# Patient Record
Sex: FEMALE | Race: WHITE | NOT HISPANIC OR LATINO
[De-identification: names, ages, dates, MRNs, and addresses within clinical notes are randomized per-mention and may not be internally consistent; named-entity substitution may affect disease eponyms.]

---

## 2017-03-18 ENCOUNTER — TRANSCRIPTION ENCOUNTER (OUTPATIENT)
Age: 52
End: 2017-03-18

## 2017-12-31 ENCOUNTER — TRANSCRIPTION ENCOUNTER (OUTPATIENT)
Age: 52
End: 2017-12-31

## 2018-03-07 ENCOUNTER — TRANSCRIPTION ENCOUNTER (OUTPATIENT)
Age: 53
End: 2018-03-07

## 2018-08-06 ENCOUNTER — TRANSCRIPTION ENCOUNTER (OUTPATIENT)
Age: 53
End: 2018-08-06

## 2018-12-06 ENCOUNTER — OUTPATIENT (OUTPATIENT)
Dept: OUTPATIENT SERVICES | Facility: HOSPITAL | Age: 53
LOS: 1 days | End: 2018-12-06

## 2018-12-06 PROBLEM — Z00.00 ENCOUNTER FOR PREVENTIVE HEALTH EXAMINATION: Status: ACTIVE | Noted: 2018-12-06

## 2018-12-12 ENCOUNTER — OUTPATIENT (OUTPATIENT)
Dept: OUTPATIENT SERVICES | Facility: HOSPITAL | Age: 53
LOS: 1 days | End: 2018-12-12

## 2019-03-05 ENCOUNTER — TRANSCRIPTION ENCOUNTER (OUTPATIENT)
Age: 54
End: 2019-03-05

## 2019-03-28 ENCOUNTER — OUTPATIENT (OUTPATIENT)
Dept: OUTPATIENT SERVICES | Facility: HOSPITAL | Age: 54
LOS: 1 days | End: 2019-03-28

## 2019-04-08 ENCOUNTER — APPOINTMENT (OUTPATIENT)
Dept: CARDIOLOGY | Facility: CLINIC | Age: 54
End: 2019-04-08
Payer: COMMERCIAL

## 2019-04-08 ENCOUNTER — NON-APPOINTMENT (OUTPATIENT)
Age: 54
End: 2019-04-08

## 2019-04-08 VITALS
OXYGEN SATURATION: 97 % | BODY MASS INDEX: 30.87 KG/M2 | WEIGHT: 199 LBS | SYSTOLIC BLOOD PRESSURE: 126 MMHG | HEART RATE: 75 BPM | HEIGHT: 67.5 IN | DIASTOLIC BLOOD PRESSURE: 78 MMHG

## 2019-04-08 VITALS — DIASTOLIC BLOOD PRESSURE: 82 MMHG | SYSTOLIC BLOOD PRESSURE: 120 MMHG

## 2019-04-08 DIAGNOSIS — Z78.9 OTHER SPECIFIED HEALTH STATUS: ICD-10-CM

## 2019-04-08 DIAGNOSIS — Z86.39 PERSONAL HISTORY OF OTHER ENDOCRINE, NUTRITIONAL AND METABOLIC DISEASE: ICD-10-CM

## 2019-04-08 DIAGNOSIS — I71.2 THORACIC AORTIC ANEURYSM, W/OUT RUPTURE: ICD-10-CM

## 2019-04-08 DIAGNOSIS — Z87.39 PERSONAL HISTORY OF OTHER DISEASES OF THE MUSCULOSKELETAL SYSTEM AND CONNECTIVE TISSUE: ICD-10-CM

## 2019-04-08 DIAGNOSIS — Z87.898 PERSONAL HISTORY OF OTHER SPECIFIED CONDITIONS: ICD-10-CM

## 2019-04-08 DIAGNOSIS — Z82.49 FAMILY HISTORY OF ISCHEMIC HEART DISEASE AND OTHER DISEASES OF THE CIRCULATORY SYSTEM: ICD-10-CM

## 2019-04-08 DIAGNOSIS — J30.2 OTHER SEASONAL ALLERGIC RHINITIS: ICD-10-CM

## 2019-04-08 DIAGNOSIS — G43.909 MIGRAINE, UNSPECIFIED, NOT INTRACTABLE, W/OUT STATUS MIGRAINOSUS: ICD-10-CM

## 2019-04-08 PROCEDURE — 93000 ELECTROCARDIOGRAM COMPLETE: CPT

## 2019-04-08 PROCEDURE — 99204 OFFICE O/P NEW MOD 45 MIN: CPT

## 2019-04-08 PROCEDURE — 99244 OFF/OP CNSLTJ NEW/EST MOD 40: CPT

## 2019-04-08 RX ORDER — ALBUTEROL 90 MCG
90 AEROSOL (GRAM) INHALATION
Refills: 0 | Status: ACTIVE | COMMUNITY

## 2019-04-09 ENCOUNTER — OUTPATIENT (OUTPATIENT)
Dept: OUTPATIENT SERVICES | Facility: HOSPITAL | Age: 54
LOS: 1 days | End: 2019-04-09

## 2019-04-23 ENCOUNTER — APPOINTMENT (OUTPATIENT)
Dept: CARDIOLOGY | Facility: CLINIC | Age: 54
End: 2019-04-23
Payer: COMMERCIAL

## 2019-04-23 PROCEDURE — 93306 TTE W/DOPPLER COMPLETE: CPT

## 2019-04-24 ENCOUNTER — OUTPATIENT (OUTPATIENT)
Dept: OUTPATIENT SERVICES | Facility: HOSPITAL | Age: 54
LOS: 1 days | End: 2019-04-24

## 2019-04-25 LAB — INR PPP: 2.9

## 2019-05-03 ENCOUNTER — OUTPATIENT (OUTPATIENT)
Dept: OUTPATIENT SERVICES | Facility: HOSPITAL | Age: 54
LOS: 1 days | End: 2019-05-03

## 2019-05-08 ENCOUNTER — RESULT REVIEW (OUTPATIENT)
Age: 54
End: 2019-05-08

## 2019-05-08 LAB — INR PPP: 1.6

## 2019-05-09 ENCOUNTER — RESULT REVIEW (OUTPATIENT)
Age: 54
End: 2019-05-09

## 2019-05-16 ENCOUNTER — OUTPATIENT (OUTPATIENT)
Dept: OUTPATIENT SERVICES | Facility: HOSPITAL | Age: 54
LOS: 1 days | End: 2019-05-16

## 2019-05-17 LAB — INR PPP: 1.8

## 2019-05-23 ENCOUNTER — OUTPATIENT (OUTPATIENT)
Dept: OUTPATIENT SERVICES | Facility: HOSPITAL | Age: 54
LOS: 1 days | End: 2019-05-23

## 2019-05-24 LAB — INR PPP: 2

## 2019-06-03 ENCOUNTER — OUTPATIENT (OUTPATIENT)
Dept: OUTPATIENT SERVICES | Facility: HOSPITAL | Age: 54
LOS: 1 days | End: 2019-06-03

## 2019-06-05 ENCOUNTER — RESULT REVIEW (OUTPATIENT)
Age: 54
End: 2019-06-05

## 2019-06-05 LAB — INR PPP: 3.3

## 2019-06-12 ENCOUNTER — OUTPATIENT (OUTPATIENT)
Dept: OUTPATIENT SERVICES | Facility: HOSPITAL | Age: 54
LOS: 1 days | End: 2019-06-12

## 2019-06-14 LAB — INR PPP: 2.5

## 2019-06-24 ENCOUNTER — OUTPATIENT (OUTPATIENT)
Dept: OUTPATIENT SERVICES | Facility: HOSPITAL | Age: 54
LOS: 1 days | End: 2019-06-24

## 2019-06-27 LAB — INR PPP: 2.1

## 2019-07-12 ENCOUNTER — OTHER (OUTPATIENT)
Age: 54
End: 2019-07-12

## 2019-07-14 ENCOUNTER — TRANSCRIPTION ENCOUNTER (OUTPATIENT)
Age: 54
End: 2019-07-14

## 2019-07-18 ENCOUNTER — TRANSCRIPTION ENCOUNTER (OUTPATIENT)
Age: 54
End: 2019-07-18

## 2019-07-24 ENCOUNTER — LABORATORY RESULT (OUTPATIENT)
Age: 54
End: 2019-07-24

## 2019-07-24 ENCOUNTER — TRANSCRIPTION ENCOUNTER (OUTPATIENT)
Age: 54
End: 2019-07-24

## 2019-07-25 ENCOUNTER — MEDICATION RENEWAL (OUTPATIENT)
Age: 54
End: 2019-07-25

## 2019-08-04 ENCOUNTER — TRANSCRIPTION ENCOUNTER (OUTPATIENT)
Age: 54
End: 2019-08-04

## 2019-08-24 ENCOUNTER — TRANSCRIPTION ENCOUNTER (OUTPATIENT)
Age: 54
End: 2019-08-24

## 2019-09-27 ENCOUNTER — LABORATORY RESULT (OUTPATIENT)
Age: 54
End: 2019-09-27

## 2019-09-27 ENCOUNTER — TRANSCRIPTION ENCOUNTER (OUTPATIENT)
Age: 54
End: 2019-09-27

## 2019-10-07 ENCOUNTER — OUTPATIENT (OUTPATIENT)
Dept: OUTPATIENT SERVICES | Facility: HOSPITAL | Age: 54
LOS: 1 days | End: 2019-10-07

## 2019-10-07 ENCOUNTER — APPOINTMENT (OUTPATIENT)
Dept: CT IMAGING | Facility: CLINIC | Age: 54
End: 2019-10-07
Payer: COMMERCIAL

## 2019-10-07 ENCOUNTER — APPOINTMENT (OUTPATIENT)
Dept: CARDIOLOGY | Facility: CLINIC | Age: 54
End: 2019-10-07
Payer: COMMERCIAL

## 2019-10-07 ENCOUNTER — NON-APPOINTMENT (OUTPATIENT)
Age: 54
End: 2019-10-07

## 2019-10-07 VITALS
BODY MASS INDEX: 28.7 KG/M2 | WEIGHT: 185 LBS | SYSTOLIC BLOOD PRESSURE: 110 MMHG | DIASTOLIC BLOOD PRESSURE: 62 MMHG | OXYGEN SATURATION: 97 % | HEART RATE: 66 BPM | HEIGHT: 67.5 IN

## 2019-10-07 PROCEDURE — 93000 ELECTROCARDIOGRAM COMPLETE: CPT

## 2019-10-07 PROCEDURE — 99214 OFFICE O/P EST MOD 30 MIN: CPT

## 2019-10-07 PROCEDURE — 70490 CT SOFT TISSUE NECK W/O DYE: CPT

## 2019-10-07 RX ORDER — WARFARIN SODIUM 5 MG/1
5 TABLET ORAL
Refills: 0 | Status: DISCONTINUED | COMMUNITY
End: 2019-10-07

## 2019-10-07 RX ORDER — UBIDECARENONE 50 MG
CAPSULE ORAL
Refills: 0 | Status: DISCONTINUED | COMMUNITY
End: 2019-10-07

## 2019-10-07 RX ORDER — BILBERRY
10 CAPSULE ORAL
Refills: 0 | Status: DISCONTINUED | COMMUNITY
End: 2019-10-07

## 2019-10-08 ENCOUNTER — TRANSCRIPTION ENCOUNTER (OUTPATIENT)
Age: 54
End: 2019-10-08

## 2019-11-08 ENCOUNTER — LABORATORY RESULT (OUTPATIENT)
Age: 54
End: 2019-11-08

## 2019-11-11 ENCOUNTER — TRANSCRIPTION ENCOUNTER (OUTPATIENT)
Age: 54
End: 2019-11-11

## 2019-11-12 ENCOUNTER — APPOINTMENT (OUTPATIENT)
Dept: RADIOLOGY | Facility: CLINIC | Age: 54
End: 2019-11-12
Payer: COMMERCIAL

## 2019-11-12 PROCEDURE — 77080 DXA BONE DENSITY AXIAL: CPT

## 2019-11-21 ENCOUNTER — APPOINTMENT (OUTPATIENT)
Dept: MAMMOGRAPHY | Facility: CLINIC | Age: 54
End: 2019-11-21
Payer: COMMERCIAL

## 2019-11-21 PROCEDURE — 77067 SCR MAMMO BI INCL CAD: CPT

## 2019-11-21 PROCEDURE — 77063 BREAST TOMOSYNTHESIS BI: CPT

## 2019-12-04 ENCOUNTER — TRANSCRIPTION ENCOUNTER (OUTPATIENT)
Age: 54
End: 2019-12-04

## 2019-12-04 ENCOUNTER — LABORATORY RESULT (OUTPATIENT)
Age: 54
End: 2019-12-04

## 2019-12-24 ENCOUNTER — TRANSCRIPTION ENCOUNTER (OUTPATIENT)
Age: 54
End: 2019-12-24

## 2019-12-24 ENCOUNTER — LABORATORY RESULT (OUTPATIENT)
Age: 54
End: 2019-12-24

## 2020-01-04 ENCOUNTER — TRANSCRIPTION ENCOUNTER (OUTPATIENT)
Age: 55
End: 2020-01-04

## 2020-01-09 ENCOUNTER — TRANSCRIPTION ENCOUNTER (OUTPATIENT)
Age: 55
End: 2020-01-09

## 2020-01-10 LAB — INR PPP: 1.88

## 2020-01-23 ENCOUNTER — OUTPATIENT (OUTPATIENT)
Dept: OUTPATIENT SERVICES | Facility: HOSPITAL | Age: 55
LOS: 1 days | End: 2020-01-23

## 2020-01-31 ENCOUNTER — APPOINTMENT (OUTPATIENT)
Dept: CT IMAGING | Facility: CLINIC | Age: 55
End: 2020-01-31
Payer: COMMERCIAL

## 2020-01-31 ENCOUNTER — OUTPATIENT (OUTPATIENT)
Dept: OUTPATIENT SERVICES | Facility: HOSPITAL | Age: 55
LOS: 1 days | End: 2020-01-31

## 2020-01-31 PROCEDURE — 74176 CT ABD & PELVIS W/O CONTRAST: CPT

## 2020-02-17 LAB — INR PPP: 3.02

## 2020-02-21 ENCOUNTER — TRANSCRIPTION ENCOUNTER (OUTPATIENT)
Age: 55
End: 2020-02-21

## 2020-03-10 ENCOUNTER — TRANSCRIPTION ENCOUNTER (OUTPATIENT)
Age: 55
End: 2020-03-10

## 2020-03-13 ENCOUNTER — TRANSCRIPTION ENCOUNTER (OUTPATIENT)
Age: 55
End: 2020-03-13

## 2020-03-13 LAB — INR PPP: 2.27

## 2020-04-13 ENCOUNTER — APPOINTMENT (OUTPATIENT)
Dept: CARDIOLOGY | Facility: CLINIC | Age: 55
End: 2020-04-13
Payer: COMMERCIAL

## 2020-04-13 PROCEDURE — 99442: CPT

## 2020-04-25 ENCOUNTER — MESSAGE (OUTPATIENT)
Age: 55
End: 2020-04-25

## 2020-04-30 ENCOUNTER — TRANSCRIPTION ENCOUNTER (OUTPATIENT)
Age: 55
End: 2020-04-30

## 2020-05-08 LAB — INR PPP: 1.61

## 2020-05-18 ENCOUNTER — OUTPATIENT (OUTPATIENT)
Dept: OUTPATIENT SERVICES | Facility: HOSPITAL | Age: 55
LOS: 1 days | End: 2020-05-18

## 2020-05-18 LAB — INR PPP: 2

## 2020-05-28 ENCOUNTER — OUTPATIENT (OUTPATIENT)
Dept: OUTPATIENT SERVICES | Facility: HOSPITAL | Age: 55
LOS: 1 days | End: 2020-05-28

## 2020-05-28 LAB — INR PPP: 1.8

## 2020-06-05 ENCOUNTER — OUTPATIENT (OUTPATIENT)
Dept: OUTPATIENT SERVICES | Facility: HOSPITAL | Age: 55
LOS: 1 days | End: 2020-06-05

## 2020-06-10 LAB — INR PPP: NORMAL

## 2020-06-13 ENCOUNTER — TRANSCRIPTION ENCOUNTER (OUTPATIENT)
Age: 55
End: 2020-06-13

## 2020-06-18 ENCOUNTER — APPOINTMENT (OUTPATIENT)
Dept: CARDIOLOGY | Facility: CLINIC | Age: 55
End: 2020-06-18

## 2020-06-24 LAB — INR PPP: 2.36

## 2020-07-14 ENCOUNTER — OUTPATIENT (OUTPATIENT)
Dept: OUTPATIENT SERVICES | Facility: HOSPITAL | Age: 55
LOS: 1 days | End: 2020-07-14

## 2020-07-15 LAB — INR PPP: NORMAL

## 2020-08-02 ENCOUNTER — TRANSCRIPTION ENCOUNTER (OUTPATIENT)
Age: 55
End: 2020-08-02

## 2020-08-07 LAB — INR PPP: 2.48

## 2020-09-02 ENCOUNTER — TRANSCRIPTION ENCOUNTER (OUTPATIENT)
Age: 55
End: 2020-09-02

## 2020-09-04 LAB — INR PPP: 2.3

## 2020-09-09 ENCOUNTER — APPOINTMENT (OUTPATIENT)
Dept: ULTRASOUND IMAGING | Facility: CLINIC | Age: 55
End: 2020-09-09
Payer: COMMERCIAL

## 2020-09-09 PROCEDURE — 76536 US EXAM OF HEAD AND NECK: CPT

## 2020-09-30 ENCOUNTER — TRANSCRIPTION ENCOUNTER (OUTPATIENT)
Age: 55
End: 2020-09-30

## 2020-10-01 LAB — INR PPP: 1.76

## 2020-10-05 ENCOUNTER — RX RENEWAL (OUTPATIENT)
Age: 55
End: 2020-10-05

## 2020-11-30 ENCOUNTER — TRANSCRIPTION ENCOUNTER (OUTPATIENT)
Age: 55
End: 2020-11-30

## 2020-12-07 ENCOUNTER — OUTPATIENT (OUTPATIENT)
Dept: OUTPATIENT SERVICES | Facility: HOSPITAL | Age: 55
LOS: 1 days | End: 2020-12-07

## 2020-12-07 LAB — INR PPP: 1.9

## 2020-12-10 LAB — INR PPP: 2.7

## 2020-12-17 ENCOUNTER — APPOINTMENT (OUTPATIENT)
Dept: MAMMOGRAPHY | Facility: CLINIC | Age: 55
End: 2020-12-17
Payer: COMMERCIAL

## 2020-12-17 PROCEDURE — 77067 SCR MAMMO BI INCL CAD: CPT

## 2020-12-17 PROCEDURE — 77063 BREAST TOMOSYNTHESIS BI: CPT

## 2020-12-20 ENCOUNTER — TRANSCRIPTION ENCOUNTER (OUTPATIENT)
Age: 55
End: 2020-12-20

## 2020-12-20 ENCOUNTER — LABORATORY RESULT (OUTPATIENT)
Age: 55
End: 2020-12-20

## 2021-01-06 ENCOUNTER — APPOINTMENT (OUTPATIENT)
Dept: ULTRASOUND IMAGING | Facility: CLINIC | Age: 56
End: 2021-01-06
Payer: COMMERCIAL

## 2021-01-06 PROCEDURE — 76642 ULTRASOUND BREAST LIMITED: CPT | Mod: LT

## 2021-01-13 ENCOUNTER — TRANSCRIPTION ENCOUNTER (OUTPATIENT)
Age: 56
End: 2021-01-13

## 2021-01-18 LAB — INR PPP: 2.42

## 2021-01-30 ENCOUNTER — TRANSCRIPTION ENCOUNTER (OUTPATIENT)
Age: 56
End: 2021-01-30

## 2021-02-11 LAB — INR PPP: 2.19

## 2021-02-12 ENCOUNTER — APPOINTMENT (OUTPATIENT)
Dept: CARDIOLOGY | Facility: CLINIC | Age: 56
End: 2021-02-12
Payer: COMMERCIAL

## 2021-02-12 PROCEDURE — 93306 TTE W/DOPPLER COMPLETE: CPT

## 2021-02-12 PROCEDURE — 99072 ADDL SUPL MATRL&STAF TM PHE: CPT

## 2021-02-26 ENCOUNTER — APPOINTMENT (OUTPATIENT)
Dept: CARDIOLOGY | Facility: CLINIC | Age: 56
End: 2021-02-26
Payer: COMMERCIAL

## 2021-02-26 ENCOUNTER — NON-APPOINTMENT (OUTPATIENT)
Age: 56
End: 2021-02-26

## 2021-02-26 VITALS
HEIGHT: 67.5 IN | WEIGHT: 189 LBS | DIASTOLIC BLOOD PRESSURE: 80 MMHG | SYSTOLIC BLOOD PRESSURE: 124 MMHG | OXYGEN SATURATION: 99 % | TEMPERATURE: 97.5 F | HEART RATE: 84 BPM | BODY MASS INDEX: 29.32 KG/M2

## 2021-02-26 DIAGNOSIS — G45.9 TRANSIENT CEREBRAL ISCHEMIC ATTACK, UNSPECIFIED: ICD-10-CM

## 2021-02-26 PROCEDURE — 93000 ELECTROCARDIOGRAM COMPLETE: CPT

## 2021-02-26 PROCEDURE — 99214 OFFICE O/P EST MOD 30 MIN: CPT

## 2021-02-26 PROCEDURE — 99072 ADDL SUPL MATRL&STAF TM PHE: CPT

## 2021-02-26 RX ORDER — GABAPENTIN 300 MG
300 TABLET ORAL 3 TIMES DAILY
Refills: 0 | Status: DISCONTINUED | COMMUNITY
End: 2021-02-26

## 2021-02-27 ENCOUNTER — TRANSCRIPTION ENCOUNTER (OUTPATIENT)
Age: 56
End: 2021-02-27

## 2021-03-01 LAB — INR PPP: 2.2

## 2021-03-10 ENCOUNTER — TRANSCRIPTION ENCOUNTER (OUTPATIENT)
Age: 56
End: 2021-03-10

## 2021-03-15 LAB — INR PPP: 2.52

## 2021-03-17 ENCOUNTER — RX RENEWAL (OUTPATIENT)
Age: 56
End: 2021-03-17

## 2021-03-17 ENCOUNTER — NON-APPOINTMENT (OUTPATIENT)
Age: 56
End: 2021-03-17

## 2021-04-19 ENCOUNTER — APPOINTMENT (OUTPATIENT)
Dept: FAMILY MEDICINE | Facility: CLINIC | Age: 56
End: 2021-04-19

## 2021-04-27 ENCOUNTER — TRANSCRIPTION ENCOUNTER (OUTPATIENT)
Age: 56
End: 2021-04-27

## 2021-04-29 LAB — INR PPP: 2

## 2021-06-03 ENCOUNTER — OUTPATIENT (OUTPATIENT)
Dept: OUTPATIENT SERVICES | Facility: HOSPITAL | Age: 56
LOS: 1 days | End: 2021-06-03

## 2021-06-15 LAB — INR PPP: 1.4

## 2021-06-28 ENCOUNTER — APPOINTMENT (OUTPATIENT)
Dept: ULTRASOUND IMAGING | Facility: CLINIC | Age: 56
End: 2021-06-28
Payer: COMMERCIAL

## 2021-06-28 ENCOUNTER — APPOINTMENT (OUTPATIENT)
Dept: MAMMOGRAPHY | Facility: CLINIC | Age: 56
End: 2021-06-28
Payer: COMMERCIAL

## 2021-06-28 PROCEDURE — G0279: CPT | Mod: LT

## 2021-06-28 PROCEDURE — 77065 DX MAMMO INCL CAD UNI: CPT | Mod: LT

## 2021-06-28 PROCEDURE — 76642 ULTRASOUND BREAST LIMITED: CPT | Mod: LT

## 2021-07-14 ENCOUNTER — APPOINTMENT (OUTPATIENT)
Dept: BREAST CENTER | Facility: CLINIC | Age: 56
End: 2021-07-14
Payer: COMMERCIAL

## 2021-07-14 VITALS
HEIGHT: 67.5 IN | BODY MASS INDEX: 29.94 KG/M2 | SYSTOLIC BLOOD PRESSURE: 121 MMHG | DIASTOLIC BLOOD PRESSURE: 76 MMHG | WEIGHT: 193 LBS | HEART RATE: 67 BPM | TEMPERATURE: 97.7 F | OXYGEN SATURATION: 99 %

## 2021-07-14 DIAGNOSIS — Z80.0 FAMILY HISTORY OF MALIGNANT NEOPLASM OF DIGESTIVE ORGANS: ICD-10-CM

## 2021-07-14 DIAGNOSIS — Z80.49 FAMILY HISTORY OF MALIGNANT NEOPLASM OF OTHER GENITAL ORGANS: ICD-10-CM

## 2021-07-14 DIAGNOSIS — R92.8 OTHER ABNORMAL AND INCONCLUSIVE FINDINGS ON DIAGNOSTIC IMAGING OF BREAST: ICD-10-CM

## 2021-07-14 DIAGNOSIS — Z80.41 FAMILY HISTORY OF MALIGNANT NEOPLASM OF OVARY: ICD-10-CM

## 2021-07-14 DIAGNOSIS — Z80.3 FAMILY HISTORY OF MALIGNANT NEOPLASM OF BREAST: ICD-10-CM

## 2021-07-14 PROCEDURE — 99072 ADDL SUPL MATRL&STAF TM PHE: CPT

## 2021-07-14 PROCEDURE — 99203 OFFICE O/P NEW LOW 30 MIN: CPT

## 2021-07-14 NOTE — HISTORY OF PRESENT ILLNESS
[FreeTextEntry1] : Pt is a 56 year old white female referred for consultation by Rachel Coffey MD for abnormal mmg. \par Pt was a former pt of mine 11 years ago at [facility]. \par She recently moved back from [place] and works at [employer]. \par \par 12/17/20, NFR, Bilat sMMG: fatty, no susp findings, L 3x2mm slightly lobulated nodule in UO aspect stable comp to 2019, U/S can be obtained for further eval, bilat vascular calcs appear benign, rec add imaging, BR0 ADDENDUM: pt will return for L breast U/S\par 1/6/21, Fior, L U/S: no susp findings, nodule seen on mmg is not visualized on U/S, f/u mmg on L breast rec in 6 mos, BR3\par 6/28/21, NFR, L dMMG: fatty, no susp findings, small focal asymm lateral breast no sig interval change, rec 6 mos.f/u, BR3\par 6/28/21, NFR, L U/S: no susp findings, mmg asymm not identified on U/S, rec mmg 6 mos, BR3\par Pt denies any breast lesions, discharge or masses.\par \par Fhx: mGreat Aunt w/ Breast at 70, mGreat Aunt w/ Ovarian at ? age. Pt's MGM had 3 sisters.

## 2021-07-14 NOTE — PAST MEDICAL HISTORY
[Perimenopausal] : The patient is perimenopausal [Menarche Age ____] : age at menarche was [unfilled] [Menopause Age____] : age at menopause was [unfilled] [Total Preg ___] : G[unfilled] [Live Births ___] : P[unfilled]  [Full Term ___] : Full Term: [unfilled] [Living ___] : Living: [unfilled] [Age At Live Birth ___] : Age at live birth: [unfilled] [History of Hormone Replacement Treatment] : has no history of hormone replacement treatment [FreeTextEntry2] : 2 boys [FreeTextEntry7] : 8 years [FreeTextEntry8] : 8 months and 3 months

## 2021-07-14 NOTE — DATA REVIEWED
[FreeTextEntry1] : 12/17/20, NFR, Bilat sMMG: fatty, no susp findings, L 3x2mm slightly lobulated nodule in UO aspect stable comp to 2019, U/S can be obtained for further eval, bilat vascular calcs appear benign, rec add imaging, BR0 ADDENDUM: pt will return for L breast U/S\par 1/6/21, NAVJOT Childress U/S: no susp findings, nodule seen on mmg is not visualized on U/S, f/u mmg on L breast rec in 6 mos, BR3\par 6/28/21, NFR, L dMMG: fatty, no susp findings, small focal asymm lateral breast no sig interval change, rec 6 mos.f/u, BR3\par 6/28/21, NFR, L U/S: no susp findings, mmg asymm not identified on U/S, rec mmg 6 mos, BR3\par Pt denies any breast lesions, discharge or masses.

## 2021-07-14 NOTE — ASSESSMENT
[FreeTextEntry1] : Pt is a 56 year old white female referred for consultation by Rachel Coffey MD for abnormal mmg. \par Pt was a former pt of mine 11 years ago at [facility]. \par She recently moved back from [place] and works at [employer]. \par \par 12/17/20, NFR, Bilat sMMG: fatty, no susp findings, L 3x2mm slightly lobulated nodule in UO aspect stable comp to 2019, U/S can be obtained for further eval, bilat vascular calcs appear benign, rec add imaging, BR0 ADDENDUM: pt will return for L breast U/S\par 1/6/21, Fior, L U/S: no susp findings, nodule seen on mmg is not visualized on U/S, f/u mmg on L breast rec in 6 mos, BR3\par 6/28/21, NFR, L dMMG: fatty, no susp findings, small focal asymm lateral breast no sig interval change, rec 6 mos.f/u, BR3\par 6/28/21, NFR, L U/S: no susp findings, mmg asymm not identified on U/S, rec mmg 6 mos, BR3\par Pt denies any breast lesions, discharge or masses.\par \par Fhx: mGreat Aunt w/ Breast at 70, mGreat Aunt w/ Ovarian at ? age. Pt's MGM had 3 sisters.\par CBE: ELLIOT, well healed scar on right UIQ. NO lymphadenopathy. \par Reviewed studies with pt, stable asymmetry on Left, rec is for 6 mos f.u at same time her annual mmg and u/s will be due. Also discussed genetic testing, pt is eligible given fhx of ovarian cancer. Pt accepts.

## 2021-07-14 NOTE — PHYSICAL EXAM
[Normocephalic] : normocephalic [Atraumatic] : atraumatic [Supple] : supple [No Supraclavicular Adenopathy] : no supraclavicular adenopathy [No Thyromegaly] : no thyromegaly [Examined in the supine and seated position] : examined in the supine and seated position [No dominant masses] : no dominant masses in right breast  [No dominant masses] : no dominant masses left breast [No Nipple Retraction] : no left nipple retraction [No Nipple Discharge] : no left nipple discharge [No Axillary Lymphadenopathy] : no left axillary lymphadenopathy [No Edema] : no edema [No Swelling] : no swelling [Full ROM] : full range of motion [No Rashes] : no rashes [No Ulceration] : no ulceration [Bra Size: ___] : Bra Size: [unfilled]

## 2021-07-21 ENCOUNTER — NON-APPOINTMENT (OUTPATIENT)
Age: 56
End: 2021-07-21

## 2021-08-27 ENCOUNTER — APPOINTMENT (OUTPATIENT)
Dept: CARDIOLOGY | Facility: CLINIC | Age: 56
End: 2021-08-27

## 2021-08-30 ENCOUNTER — OUTPATIENT (OUTPATIENT)
Dept: OUTPATIENT SERVICES | Facility: HOSPITAL | Age: 56
LOS: 1 days | End: 2021-08-30

## 2021-09-02 LAB — INR PPP: 2

## 2021-09-03 ENCOUNTER — APPOINTMENT (OUTPATIENT)
Dept: CARDIOLOGY | Facility: CLINIC | Age: 56
End: 2021-09-03
Payer: COMMERCIAL

## 2021-09-03 ENCOUNTER — NON-APPOINTMENT (OUTPATIENT)
Age: 56
End: 2021-09-03

## 2021-09-03 VITALS
TEMPERATURE: 97.3 F | BODY MASS INDEX: 30.56 KG/M2 | SYSTOLIC BLOOD PRESSURE: 124 MMHG | WEIGHT: 197 LBS | HEART RATE: 94 BPM | HEIGHT: 67.5 IN | DIASTOLIC BLOOD PRESSURE: 86 MMHG | OXYGEN SATURATION: 97 %

## 2021-09-03 PROCEDURE — 99214 OFFICE O/P EST MOD 30 MIN: CPT

## 2021-09-03 PROCEDURE — 93000 ELECTROCARDIOGRAM COMPLETE: CPT

## 2021-09-04 ENCOUNTER — NON-APPOINTMENT (OUTPATIENT)
Age: 56
End: 2021-09-04

## 2021-09-05 PROCEDURE — 71101 X-RAY EXAM UNILAT RIBS/CHEST: CPT | Mod: RT

## 2021-09-07 ENCOUNTER — APPOINTMENT (OUTPATIENT)
Dept: CT IMAGING | Facility: CLINIC | Age: 56
End: 2021-09-07
Payer: COMMERCIAL

## 2021-09-07 PROCEDURE — 71250 CT THORAX DX C-: CPT

## 2021-09-07 PROCEDURE — 74176 CT ABD & PELVIS W/O CONTRAST: CPT

## 2021-09-20 ENCOUNTER — OUTPATIENT (OUTPATIENT)
Dept: OUTPATIENT SERVICES | Facility: HOSPITAL | Age: 56
LOS: 1 days | End: 2021-09-20

## 2021-09-20 LAB — INR PPP: 2.2

## 2021-09-22 ENCOUNTER — APPOINTMENT (OUTPATIENT)
Dept: PEDIATRIC ALLERGY IMMUNOLOGY | Facility: CLINIC | Age: 56
End: 2021-09-22
Payer: COMMERCIAL

## 2021-09-22 DIAGNOSIS — J45.20 MILD INTERMITTENT ASTHMA, UNCOMPLICATED: ICD-10-CM

## 2021-09-22 DIAGNOSIS — T50.Z95A ADVERSE EFFECT OF OTHER VACCINES AND BIOLOGICAL SUBSTANCES, INITIAL ENCOUNTER: ICD-10-CM

## 2021-09-22 DIAGNOSIS — T50.905A ADVERSE EFFECT OF UNSPECIFIED DRUGS, MEDICAMENTS AND BIOLOGICAL SUBSTANCES, INITIAL ENCOUNTER: ICD-10-CM

## 2021-09-22 DIAGNOSIS — J30.9 ALLERGIC RHINITIS, UNSPECIFIED: ICD-10-CM

## 2021-09-22 DIAGNOSIS — Z91.018 ALLERGY TO OTHER FOODS: ICD-10-CM

## 2021-09-22 DIAGNOSIS — J45.909 UNSPECIFIED ASTHMA, UNCOMPLICATED: ICD-10-CM

## 2021-09-22 DIAGNOSIS — Z87.2 PERSONAL HISTORY OF DISEASES OF THE SKIN AND SUBCUTANEOUS TISSUE: ICD-10-CM

## 2021-09-22 PROCEDURE — 99205 OFFICE O/P NEW HI 60 MIN: CPT | Mod: 95

## 2021-09-28 ENCOUNTER — NON-APPOINTMENT (OUTPATIENT)
Age: 56
End: 2021-09-28

## 2021-09-29 ENCOUNTER — NON-APPOINTMENT (OUTPATIENT)
Age: 56
End: 2021-09-29

## 2021-10-01 ENCOUNTER — TRANSCRIPTION ENCOUNTER (OUTPATIENT)
Age: 56
End: 2021-10-01

## 2021-10-02 PROBLEM — Z87.2 HISTORY OF CHRONIC URTICARIA: Status: RESOLVED | Noted: 2021-10-02 | Resolved: 2021-10-02

## 2021-10-02 PROBLEM — T50.905A ADVERSE REACTION TO DRUG, INITIAL ENCOUNTER: Status: ACTIVE | Noted: 2021-10-02

## 2021-10-02 PROBLEM — J30.9 ALLERGIC RHINITIS: Status: ACTIVE | Noted: 2021-10-02

## 2021-10-02 PROBLEM — Z91.018 TREE NUT ALLERGY: Status: ACTIVE | Noted: 2021-10-02

## 2021-10-02 PROBLEM — T50.Z95A ADVERSE REACTION TO VACCINE, INITIAL ENCOUNTER: Status: ACTIVE | Noted: 2021-10-02

## 2021-10-02 PROBLEM — J45.20 INTERMITTENT ASTHMA WITHOUT COMPLICATION: Status: ACTIVE | Noted: 2021-10-02

## 2021-10-02 PROBLEM — J45.909 ASTHMA: Noted: 2019-04-08

## 2021-10-02 NOTE — PHYSICAL EXAM
[Alert] : alert [Healthy Appearance] : healthy appearance [No Acute Distress] : no acute distress [Normal Lips/Tongue] : the lips and tongue were normal [Normal Outer Ear/Nose] : the ears and nose were normal in appearance [No Nasal Discharge] : no nasal discharge [Normal Rate and Effort] : normal respiratory rhythm and effort [Conjunctival Erythema] : no conjunctival erythema

## 2021-10-02 NOTE — CONSULT LETTER
[Dear  ___] : Dear  [unfilled], [Courtesy Letter:] : I had the pleasure of seeing your patient, [unfilled], in my office today. [Please see my note below.] : Please see my note below. [Sincerely,] : Sincerely, [Consult Closing:] : Thank you very much for allowing me to participate in the care of this patient.  If you have any questions, please do not hesitate to contact me. [FreeTextEntry3] : Billie Ybarra MD\par Attending, Division of Allergy and Immunology\par [facility]

## 2021-10-02 NOTE — REVIEW OF SYSTEMS
[Rhinorrhea] : rhinorrhea [Nasal Congestion] : nasal congestion [Urticaria] : urticaria [Pruritus] : pruritus [Nl] : Genitourinary

## 2021-10-02 NOTE — HISTORY OF PRESENT ILLNESS
[de-identified] : This visit was was provided via telehealth using real-time 2-way audio visual technology. The patient, was located at home, [place], [place], at the time of the visit.\par The provider, PRESLEY REYES MD, was located at the medical office located in [address] [place] [address] [address] [place] [address] at the time of the visit. The patient and provider participated in the telehealth encounter. \par Verbal consent for telehealth services was given on 9/22/21 by the patient. \par  \par 56-year-old female with systemic lupus erythematosus (SLE), Hashimoto disease, antiphospholipid syndrome (APLS), ascending aortic aneurysm  presenting for evaluation regarding concerns about COVID19 vaccination due to history of asthma, chronic urticaria, tree nut allergy and adverse reaction to drugs and influenza vaccine.\par \par Chronic urticaria: Years ago had recurrent hives. Evaluated by an allergist and diagnosed with chronic urticaria and angioedema. Treated with multiple antihistamines per day. Resolved. \par \par Food allergy: In 2019 while eating at [employer] developed sudden "neck swelling". Self administered EpiPen and taken to ED. Treated with antihistamines and systemic steroids. Evaluated for food allergy afterwards. Tested positive to tree nuts. \par \par Adverse reaction to drugs and vaccine:\par Penicillin - given for dental procedure. Developed facial swelling and rash. Can't recall timing. In 20s.\par Levofloxacin, moxifloxin - throat closing.\par Lovenox: Itching and asthma exacerbation.\par Influenza vaccine - when she received influenza vaccines made in chicken embryos, had severe fever, fatigue and was sick for over a week. When gets Flublok she doesn't have any side effects.\par \par Latex allergy: Hives after exposure.\par \par Adhesives: Ulcer at site of adhesive and asthma exacerbation after adhesive application for EKG.\par \par IV contrast: Hives within minutes.\par \par Asthma: Not on maintenance medication. Uses albuterol as needed. Under good control. No active symptoms. \par \par Allergic rhinitis: Runny nose, nasal congestion. Previously skin tested positive to dust mites, pollen and feathers. \par \par Has never taken Miralax. Took Colace without adverse effects. No colonoscopy done before since she couldn't get clearance for the procedure due to her aortic aneurysm. and other comorbidities. \par \par Of note, has history of multiple bouts of pericarditis due to SLE. Has had TIAs due APLS. Taking amitriptyline for migraine headaches and reports not being able to come off of it due to severe headaches when off. \par \par Working as a radiology technician.\par

## 2021-10-02 NOTE — REASON FOR VISIT
[Initial Consultation] : an initial consultation for [Hay Fever] : hay fever [FreeTextEntry2] : concern regarding COVID19 vaccination

## 2021-10-21 ENCOUNTER — APPOINTMENT (OUTPATIENT)
Dept: PEDIATRIC ALLERGY IMMUNOLOGY | Facility: CLINIC | Age: 56
End: 2021-10-21
Payer: COMMERCIAL

## 2021-10-21 ENCOUNTER — MED ADMIN CHARGE (OUTPATIENT)
Age: 56
End: 2021-10-21

## 2021-10-21 VITALS
TEMPERATURE: 96.2 F | SYSTOLIC BLOOD PRESSURE: 138 MMHG | DIASTOLIC BLOOD PRESSURE: 92 MMHG | HEART RATE: 96 BPM | HEIGHT: 67.5 IN | OXYGEN SATURATION: 97 % | WEIGHT: 190 LBS | BODY MASS INDEX: 29.47 KG/M2

## 2021-10-21 DIAGNOSIS — Z23 ENCOUNTER FOR IMMUNIZATION: ICD-10-CM

## 2021-10-21 PROCEDURE — 99212 OFFICE O/P EST SF 10 MIN: CPT | Mod: 25

## 2021-10-21 PROCEDURE — 0031A: CPT

## 2021-10-21 RX ORDER — RANITIDINE HYDROCHLORIDE 150 MG/1
150 TABLET, FILM COATED ORAL
Refills: 0 | Status: COMPLETED | COMMUNITY
End: 2021-10-21

## 2021-10-25 PROBLEM — Z23 NEED FOR COVID-19 VACCINE: Status: RESOLVED | Noted: 2021-10-02 | Resolved: 2021-10-25

## 2021-10-25 PROBLEM — Z23 NEED FOR COVID-19 VACCINE: Status: ACTIVE | Noted: 2021-10-25

## 2021-11-02 ENCOUNTER — OUTPATIENT (OUTPATIENT)
Dept: OUTPATIENT SERVICES | Facility: HOSPITAL | Age: 56
LOS: 1 days | End: 2021-11-02

## 2021-11-04 LAB — INR PPP: 1.9

## 2021-11-08 ENCOUNTER — APPOINTMENT (OUTPATIENT)
Dept: CARDIOLOGY | Facility: CLINIC | Age: 56
End: 2021-11-08
Payer: COMMERCIAL

## 2021-11-08 ENCOUNTER — NON-APPOINTMENT (OUTPATIENT)
Age: 56
End: 2021-11-08

## 2021-11-08 VITALS
SYSTOLIC BLOOD PRESSURE: 124 MMHG | HEART RATE: 85 BPM | OXYGEN SATURATION: 99 % | WEIGHT: 199 LBS | DIASTOLIC BLOOD PRESSURE: 88 MMHG | HEIGHT: 67.5 IN | TEMPERATURE: 97.1 F | BODY MASS INDEX: 30.87 KG/M2

## 2021-11-08 PROCEDURE — 99214 OFFICE O/P EST MOD 30 MIN: CPT

## 2021-11-08 PROCEDURE — 93306 TTE W/DOPPLER COMPLETE: CPT

## 2021-11-08 PROCEDURE — 93000 ELECTROCARDIOGRAM COMPLETE: CPT

## 2021-11-08 RX ORDER — WARFARIN 7.5 MG/1
7.5 TABLET ORAL
Qty: 90 | Refills: 1 | Status: DISCONTINUED | COMMUNITY
End: 2021-11-08

## 2021-11-08 RX ORDER — WARFARIN 10 MG/1
10 TABLET ORAL
Qty: 90 | Refills: 3 | Status: DISCONTINUED | COMMUNITY
Start: 2019-07-25 | End: 2021-11-08

## 2021-11-10 ENCOUNTER — NON-APPOINTMENT (OUTPATIENT)
Age: 56
End: 2021-11-10

## 2021-12-17 ENCOUNTER — OUTPATIENT (OUTPATIENT)
Dept: OUTPATIENT SERVICES | Facility: HOSPITAL | Age: 56
LOS: 1 days | End: 2021-12-17

## 2021-12-17 ENCOUNTER — APPOINTMENT (OUTPATIENT)
Dept: CARDIOLOGY | Facility: CLINIC | Age: 56
End: 2021-12-17

## 2021-12-22 LAB — INR PPP: 1.9

## 2021-12-23 NOTE — HISTORY OF PRESENT ILLNESS
[de-identified] : 56-year-old female with complex medical history including SLE with multiple bouts of pericarditis, APLS on anticoagulants, ascending aortic aneurysm, migraine headaches and history of chronic spontaneous urticaria here for the JNJ vaccine. \par \par Interval history:\par Pre medicated: \par -Admits to premedicated cetirizine 10 mg the night before and this morning. She takes Pepcid yesterday. Gave famotidine at the office. \par - Denies any chest tightness or wheezing shortness of breath. Also albuterol two puff as prophylaxis this morning. \par -No hives \par  \par Past history: \par NEED FOR COVID19 VACCINE: Upon detailed review of patient's medication allergies, no shared component was found between the COVID19 vaccines and drugs associated with patient's reactions. In addition, some of patient's daily medications, which she tolerates without adverse reactions, include polyethylene glycol (PEG) as an inactive ingredient. This is reassuring, PEG is hypothesized to be the culprit behind allergic reactions to mRNA vaccines. \par  \par -Last visit:  Discussed vaccination Johnnie&Johnnie (J&J) vaccine. Discussed reports of slightly increased risk for thrombotic events after this vaccine. Patient reported discussing this with her cardiologist Dr Tena and that she would adjust her anticoagulants similar to how she does before flights. Joint decision was made to proceed with J&J vaccine, since it is a one dose vaccine and it has shared ingredients with the Flublok vaccine, which patient is known tolerate. She will receive this vaccine at our High Risk COVID19 Vaccine Clinic. \par \par Pre medicated: \par -Admits to premedicated  cetirizine 10 mg the night before and this morning. She takes Pepcid yesterday. Gave famotidine at the office. \par -  Denies any chest tightness or wheezing shortness of breath. Also albuterol two puff as prophylaxis this morning. \par -No hives \par  \par \par Currently feeling well. No wheezing, cough. Admits to itchy eyes. \par

## 2021-12-23 NOTE — END OF VISIT
[FreeTextEntry3] : GARETT Dove has acted like a scribe on my behalf. I have reviewed the note and edited where appropriate. History, PE, assessment, and plan were personally performed by me.\par \par

## 2021-12-23 NOTE — PHYSICAL EXAM
[Alert] : alert [Well Nourished] : well nourished [No Acute Distress] : no acute distress [Well Developed] : well developed [Sclera Not Icteric] : sclera not icteric [Normal TMs] : both tympanic membranes were normal [Normal Tonsils] : normal tonsils [Normal Rate and Effort] : normal respiratory rhythm and effort [No Crackles] : no crackles [Bilateral Audible Breath Sounds] : bilateral audible breath sounds [Normal Rate] : heart rate was normal  [Normal S1, S2] : normal S1 and S2 [No murmur] : no murmur [Regular Rhythm] : with a regular rhythm [Normal Cervical Lymph Nodes] : cervical [Skin Intact] : skin intact  [No Rash] : no rash [No Skin Lesions] : no skin lesions [Normal Mood] : mood was normal [Normal Affect] : affect was normal [Judgment and Insight Age Appropriate] : judgement and insight is age appropriate [Alert, Awake, Oriented as Age-Appropriate] : alert, awake, oriented as age appropriate [Conjunctival Erythema] : no conjunctival erythema [Boggy Nasal Turbinates] : no boggy and/or pale nasal turbinates [Pharyngeal erythema] : no pharyngeal erythema [Posterior Pharyngeal Cobblestoning] : no posterior pharyngeal cobblestoning [Clear Rhinorrhea] : no clear rhinorrhea was seen [Exudate] : no exudate [Wheezing] : no wheezing was heard [Patches] : no patches

## 2021-12-23 NOTE — REVIEW OF SYSTEMS
[Nl] : Integumentary [Fatigue] : no fatigue [Fever] : no fever [Eye Discharge] : no eye discharge [Eye Redness] : no redness [Puffy Eyelids] : no puffy ~T eyelids [Bloodshot Eyes] : no bloodshot ~T eyes [Rhinorrhea] : no rhinorrhea [Nasal Congestion] : no nasal congestion [Post Nasal Drip] : no post nasal drip [Sneezing] : no sneezing [Cyanosis] : no cyanosis [Edema] : no edema [Exercise Intolerance] : no persistence of exercise intolerance [Palpitations] : no palpitations [Nocturnal Awakening] : no nocturnal awakening with shortness of breath [Cough] : no cough [Wheezing Worsens With Exercise] : wheezing does not worsen with exercise [Wheezing] : no wheezing

## 2022-01-02 ENCOUNTER — TRANSCRIPTION ENCOUNTER (OUTPATIENT)
Age: 57
End: 2022-01-02

## 2022-01-03 ENCOUNTER — NON-APPOINTMENT (OUTPATIENT)
Age: 57
End: 2022-01-03

## 2022-01-04 ENCOUNTER — APPOINTMENT (OUTPATIENT)
Dept: DISASTER EMERGENCY | Facility: HOSPITAL | Age: 57
End: 2022-01-04

## 2022-01-04 ENCOUNTER — OUTPATIENT (OUTPATIENT)
Dept: INPATIENT UNIT | Facility: HOSPITAL | Age: 57
LOS: 1 days | End: 2022-01-04
Payer: COMMERCIAL

## 2022-01-04 VITALS
DIASTOLIC BLOOD PRESSURE: 88 MMHG | TEMPERATURE: 99 F | RESPIRATION RATE: 19 BRPM | SYSTOLIC BLOOD PRESSURE: 126 MMHG | OXYGEN SATURATION: 100 % | HEART RATE: 76 BPM

## 2022-01-04 VITALS
HEIGHT: 67 IN | HEART RATE: 72 BPM | WEIGHT: 195.11 LBS | SYSTOLIC BLOOD PRESSURE: 139 MMHG | OXYGEN SATURATION: 98 % | DIASTOLIC BLOOD PRESSURE: 82 MMHG | RESPIRATION RATE: 18 BRPM | TEMPERATURE: 99 F

## 2022-01-04 DIAGNOSIS — U07.1 COVID-19: ICD-10-CM

## 2022-01-04 PROCEDURE — M0247: CPT

## 2022-01-04 RX ORDER — SOTROVIMAB 62.5 MG/ML
500 INJECTION, SOLUTION, CONCENTRATE INTRAVENOUS ONCE
Refills: 0 | Status: COMPLETED | OUTPATIENT
Start: 2022-01-04 | End: 2022-01-04

## 2022-01-04 RX ORDER — SODIUM CHLORIDE 9 MG/ML
250 INJECTION INTRAMUSCULAR; INTRAVENOUS; SUBCUTANEOUS
Refills: 0 | Status: DISCONTINUED | OUTPATIENT
Start: 2022-01-04 | End: 2022-01-19

## 2022-01-04 RX ADMIN — SOTROVIMAB 116 MILLIGRAM(S): 62.5 INJECTION, SOLUTION, CONCENTRATE INTRAVENOUS at 15:17

## 2022-01-04 RX ADMIN — SODIUM CHLORIDE 25 MILLILITER(S): 9 INJECTION INTRAMUSCULAR; INTRAVENOUS; SUBCUTANEOUS at 15:17

## 2022-01-04 NOTE — CHART NOTE - NSCHARTNOTEFT_GEN_A_CORE
CC: Monoclonal Antibody Infusion/COVID 19 Positive on 1/2/22      History: Patient presents for infusion of monoclonal antibody infusion. Patient has been screened and was deemed to be a candidate.    Symptoms/ Criteria: fever, body ache, SOB    Exposure: unkown    Vaccination Status: J&J 10/26/21    Risk Profile includes: Lupus, pericarditis s/p J&J vaccine, HTN, AAA, anticoagulation therapy, BMI >30          T(f): --98.8   HR: -- 75  BP: -- 139/82  RR: -- 14  SpO2: -- 97% RA      PE:   Appearance: NAD	  HEENT:   Normal oral mucosa.   Lymphatic: No lymphadenopathy  Cardiovascular: Normal S1 S2, No JVD, No murmurs, No edema  Respiratory: Lungs clear to auscultation	  Gastrointestinal:  Soft, Non-tender. No guarding   Skin: warm and dry  Neurologic: Non-focal  Extremities: Normal range of motion.     ASSESSMENT:  Pt is a 56y year old Female Covid +  referred to the infusion center for Monoclonal antibody infusion (Sotrovimab).        PLAN:  - infusion procedure explained to patient   - Consent for monoclonal antibody infusion obtained   - Risk & benefits discussed/all questions answered  - infuse Sotrovimab over 30 minutes   - will observe patient for one hour post infusion  and then if stable discharge home with outpt follow up as planned by PMD.    POST INFUSION ASSESSMENT:   DISCHARGE at approximately  1630  hours    - Patient tolerated infusion well denies complaints of chest pain/SOB/dizzines/ palps  - VSS for discharge home  - D/C instructions given/ fact sheet included.  - Patient to follow-up with PCP as needed.

## 2022-01-05 ENCOUNTER — APPOINTMENT (OUTPATIENT)
Dept: CARDIOLOGY | Facility: CLINIC | Age: 57
End: 2022-01-05

## 2022-01-18 ENCOUNTER — APPOINTMENT (OUTPATIENT)
Dept: CARDIOLOGY | Facility: CLINIC | Age: 57
End: 2022-01-18
Payer: COMMERCIAL

## 2022-01-18 ENCOUNTER — NON-APPOINTMENT (OUTPATIENT)
Age: 57
End: 2022-01-18

## 2022-01-18 VITALS
SYSTOLIC BLOOD PRESSURE: 140 MMHG | HEART RATE: 64 BPM | WEIGHT: 190 LBS | HEIGHT: 67 IN | DIASTOLIC BLOOD PRESSURE: 86 MMHG | BODY MASS INDEX: 29.82 KG/M2 | OXYGEN SATURATION: 99 %

## 2022-01-18 DIAGNOSIS — E31.0 AUTOIMMUNE POLYGLANDULAR FAILURE: ICD-10-CM

## 2022-01-18 DIAGNOSIS — U07.1 COVID-19: ICD-10-CM

## 2022-01-18 DIAGNOSIS — Z86.79 PERSONAL HISTORY OF OTHER DISEASES OF THE CIRCULATORY SYSTEM: ICD-10-CM

## 2022-01-18 PROCEDURE — 93000 ELECTROCARDIOGRAM COMPLETE: CPT

## 2022-01-18 PROCEDURE — 99214 OFFICE O/P EST MOD 30 MIN: CPT | Mod: 25

## 2022-01-18 RX ORDER — COLCHICINE 0.6 MG/1
CAPSULE ORAL
Refills: 0 | Status: DISCONTINUED | COMMUNITY
End: 2022-01-18

## 2022-01-18 NOTE — REASON FOR VISIT
[Hypertension] : hypertension [Infectious/Inflammatory] : infectious/inflammatory [Other: ____] : [unfilled] [FreeTextEntry3] : Dr. Ramos

## 2022-01-18 NOTE — CARDIOLOGY SUMMARY
[de-identified] : 1/18/2022, NSR with non-specific T wave changes.\par 9/3/2021: Normal sinus rhythm, nonspecific ST-T wave changes, poor R wave progression\par 11/8/21: NSR, unchanged from above.  [de-identified] : 11/8/2021, LV EF 60-65%, trace MR, trace TR.\par 2/12/21: EF 60-65%, minimal MR, normal LA size, ascending aorta 4.0 cm, minimal TR, minimal PI.

## 2022-01-18 NOTE — HISTORY OF PRESENT ILLNESS
[FreeTextEntry1] : 57F w/ PMH of SLE with recurrent pericarditis, hypothyroidism, APLS on coumadin, asthma presenting to establish care with cardiology.  She has had many bout of recurrent pericarditis most related to SLE flares.  She has been on and off colchicine as well as steroids in the past.  She denies angina chest pain or significant SOB.  She has her INR monitored by venipuncture as her finger sticks have been unreliable.\par \par 11/8/21:\par Since our last visit she had the J&J vaccine at the high-risk vaccine clinic due to her multiple allergies.  She unfortunately had a rib fracture on 9/4/21 and has been recovering.  Because of her SOB and chest pressure symptoms that she has been feeling the last few days, she was instructed not to go to work and get a COVID19 PCR test.  She feels as if her pericardiitis may be coming back.  She noticed that she has been using her nebulizer more frequently as her rescue inhaler is not working. \par \par 1/18/2022:\par Patient contracted COVID-19. Received monoclonal Abs. Feeling well. Still in colchicine for her pericarditis from J&J vaccine until April 2022. Never did the NSAID because of history of peptic ulcer disease.

## 2022-01-18 NOTE — DISCUSSION/SUMMARY
[Patient] : the patient [___ Month(s)] : in [unfilled] month(s) [With ___] : with [unfilled] [FreeTextEntry1] : 1. HTN - controlled, continue losartan 50 mg PO daily\par 2. Aortic aneurysm -  stable size of 4.0 cm without change.  Continue periodic surveillance.\par 3. APLS - on coumadin with an INR goal of 2.0-3.0; very well controlled\par 4. ?Pericarditis - Patient never took the NSAID taper because of history of peptic ulcer disease. On colchicine until April 2022. Mild GI upset with colchicine but tolerable.\par \par

## 2022-01-18 NOTE — REVIEW OF SYSTEMS
[Dyspnea on exertion] : dyspnea during exertion [Chest Discomfort] : chest discomfort [Wheezing] : wheezing [Joint Pain] : joint pain [Under Stress] : under stress [Negative] : Heme/Lymph

## 2022-01-18 NOTE — PHYSICAL EXAM
[Well Developed] : well developed [Well Nourished] : well nourished [No Acute Distress] : no acute distress [Normal Conjunctiva] : normal conjunctiva [Normal S1, S2] : normal S1, S2 [No Murmur] : no murmur [No Rub] : no rub [No Gallop] : no gallop [Clear Lung Fields] : clear lung fields [Good Air Entry] : good air entry [No Respiratory Distress] : no respiratory distress  [Normal Bowel Sounds] : normal bowel sounds [Normal] : moves all extremities, no focal deficits, normal speech [Alert and Oriented] : alert and oriented [Normal memory] : normal memory [de-identified] : No carotid bruits auscultated bilaterally

## 2022-01-26 ENCOUNTER — RX RENEWAL (OUTPATIENT)
Age: 57
End: 2022-01-26

## 2022-02-02 ENCOUNTER — TRANSCRIPTION ENCOUNTER (OUTPATIENT)
Age: 57
End: 2022-02-02

## 2022-02-03 ENCOUNTER — TRANSCRIPTION ENCOUNTER (OUTPATIENT)
Age: 57
End: 2022-02-03

## 2022-02-13 ENCOUNTER — TRANSCRIPTION ENCOUNTER (OUTPATIENT)
Age: 57
End: 2022-02-13

## 2022-02-14 ENCOUNTER — NON-APPOINTMENT (OUTPATIENT)
Age: 57
End: 2022-02-14

## 2022-02-17 LAB — INR PPP: 2.42

## 2022-02-22 ENCOUNTER — APPOINTMENT (OUTPATIENT)
Dept: CARDIOLOGY | Facility: CLINIC | Age: 57
End: 2022-02-22
Payer: COMMERCIAL

## 2022-02-22 ENCOUNTER — NON-APPOINTMENT (OUTPATIENT)
Age: 57
End: 2022-02-22

## 2022-02-22 VITALS
BODY MASS INDEX: 30.61 KG/M2 | HEIGHT: 67 IN | DIASTOLIC BLOOD PRESSURE: 78 MMHG | OXYGEN SATURATION: 98 % | SYSTOLIC BLOOD PRESSURE: 118 MMHG | WEIGHT: 195 LBS | HEART RATE: 80 BPM

## 2022-02-22 PROCEDURE — 99214 OFFICE O/P EST MOD 30 MIN: CPT | Mod: 25

## 2022-02-22 PROCEDURE — 93000 ELECTROCARDIOGRAM COMPLETE: CPT

## 2022-02-22 NOTE — HISTORY OF PRESENT ILLNESS
[FreeTextEntry1] : 57F w/ PMH of SLE with recurrent pericarditis, hypothyroidism, APLS on coumadin, asthma presenting to establish care with cardiology.  She has had many bout of recurrent pericarditis most related to SLE flares.  She has been on and off colchicine as well as steroids in the past.  She denies angina chest pain or significant SOB.  She has her INR monitored by venipuncture as her finger sticks have been unreliable.\par \par 11/8/21:\par Since our last visit she had the J&J vaccine at the high-risk vaccine clinic due to her multiple allergies.  She unfortunately had a rib fracture on 9/4/21 and has been recovering.  Because of her SOB and chest pressure symptoms that she has been feeling the last few days, she was instructed not to go to work and get a COVID19 PCR test.  She feels as if her pericardiitis may be coming back.  She noticed that she has been using her nebulizer more frequently as her rescue inhaler is not working. \par \par 1/18/2022:\par Patient contracted COVID-19. Received monoclonal Abs. Feeling well. Still in colchicine for her pericarditis from J&J vaccine until April 2022. Never did the NSAID because of history of peptic ulcer disease. \par \par 2/22/2022\par Patient states last week (Monday) developed sudden onset of tachycardia with HRs into the 120s. She has now chest pain and feels SOB. Stable over the past week. Most recent INR was between 2-3 (February 13, 2022)

## 2022-02-22 NOTE — PHYSICAL EXAM
[Well Developed] : well developed [Well Nourished] : well nourished [No Acute Distress] : no acute distress [Normal Conjunctiva] : normal conjunctiva [Normal S1, S2] : normal S1, S2 [No Murmur] : no murmur [No Rub] : no rub [No Gallop] : no gallop [Clear Lung Fields] : clear lung fields [Good Air Entry] : good air entry [No Respiratory Distress] : no respiratory distress  [Normal Bowel Sounds] : normal bowel sounds [Normal] : moves all extremities, no focal deficits, normal speech [Alert and Oriented] : alert and oriented [Normal memory] : normal memory [de-identified] : No carotid bruits auscultated bilaterally

## 2022-02-22 NOTE — DISCUSSION/SUMMARY
[Patient] : the patient [With ___] : with [unfilled] [FreeTextEntry1] : 1. HTN - controlled, continue losartan 50 mg PO daily\par 2. Aortic aneurysm -  stable size of 4.0 cm without change.  Continue periodic surveillance.\par 3. APLS - on coumadin with an INR goal of 2.0-3.0; very well controlled\par 4. ?Pericarditis - Patient never took the NSAID taper because of history of peptic ulcer disease. On colchicine until April 2022. Mild GI upset with colchicine but tolerable.\par 5. chest pain/dyspnea: recommend pharmacologic nuclear stress testing and echocardiogram. \par \par \par \par

## 2022-02-22 NOTE — CARDIOLOGY SUMMARY
[de-identified] : 1/18/2022, NSR with non-specific T wave changes.\par 9/3/2021: Normal sinus rhythm, nonspecific ST-T wave changes, poor R wave progression\par 11/8/21: NSR, unchanged from above.  [de-identified] : 11/8/2021, LV EF 60-65%, trace MR, trace TR.\par 2/12/21: EF 60-65%, minimal MR, normal LA size, ascending aorta 4.0 cm, minimal TR, minimal PI.

## 2022-03-17 ENCOUNTER — INPATIENT (INPATIENT)
Facility: HOSPITAL | Age: 57
LOS: 0 days | Discharge: HOME | End: 2022-03-18
Attending: EMERGENCY MEDICINE | Admitting: EMERGENCY MEDICINE
Payer: COMMERCIAL

## 2022-03-17 ENCOUNTER — APPOINTMENT (OUTPATIENT)
Dept: CARDIOLOGY | Facility: CLINIC | Age: 57
End: 2022-03-17
Payer: COMMERCIAL

## 2022-03-17 ENCOUNTER — OUTPATIENT (OUTPATIENT)
Dept: OUTPATIENT SERVICES | Facility: HOSPITAL | Age: 57
LOS: 1 days | End: 2022-03-17

## 2022-03-17 ENCOUNTER — NON-APPOINTMENT (OUTPATIENT)
Age: 57
End: 2022-03-17

## 2022-03-17 PROCEDURE — 71045 X-RAY EXAM CHEST 1 VIEW: CPT | Mod: 26

## 2022-03-17 PROCEDURE — 93306 TTE W/DOPPLER COMPLETE: CPT

## 2022-03-17 PROCEDURE — A9502: CPT

## 2022-03-17 PROCEDURE — 93015 CV STRESS TEST SUPVJ I&R: CPT

## 2022-03-17 PROCEDURE — 78452 HT MUSCLE IMAGE SPECT MULT: CPT

## 2022-03-17 PROCEDURE — 93010 ELECTROCARDIOGRAM REPORT: CPT

## 2022-03-17 PROCEDURE — 99285 EMERGENCY DEPT VISIT HI MDM: CPT

## 2022-03-18 ENCOUNTER — OUTPATIENT (OUTPATIENT)
Dept: OUTPATIENT SERVICES | Facility: HOSPITAL | Age: 57
LOS: 1 days | End: 2022-03-18

## 2022-03-18 PROCEDURE — 93454 CORONARY ARTERY ANGIO S&I: CPT | Mod: 26

## 2022-03-22 ENCOUNTER — APPOINTMENT (OUTPATIENT)
Dept: CARDIOLOGY | Facility: CLINIC | Age: 57
End: 2022-03-22
Payer: COMMERCIAL

## 2022-03-22 VITALS
TEMPERATURE: 97.3 F | HEIGHT: 67 IN | DIASTOLIC BLOOD PRESSURE: 62 MMHG | OXYGEN SATURATION: 98 % | SYSTOLIC BLOOD PRESSURE: 98 MMHG | HEART RATE: 68 BPM | BODY MASS INDEX: 30.61 KG/M2 | WEIGHT: 195 LBS

## 2022-03-22 PROCEDURE — 99213 OFFICE O/P EST LOW 20 MIN: CPT

## 2022-03-22 RX ORDER — CHOLECALCIFEROL (VITAMIN D3) 125 MCG
5000 CAPSULE ORAL
Refills: 0 | Status: DISCONTINUED | COMMUNITY
End: 2022-03-22

## 2022-03-22 RX ORDER — LEVOTHYROXINE SODIUM 0.17 MG/1
175 TABLET ORAL DAILY
Qty: 90 | Refills: 3 | Status: DISCONTINUED | COMMUNITY
Start: 2019-04-19 | End: 2022-03-22

## 2022-03-22 NOTE — HISTORY OF PRESENT ILLNESS
[FreeTextEntry1] : Chest pain invasive coronary angiography revealed normal coronary arteries.  Continued observation is the best option.\par \par Antiphospholipid syndrome: The patient is back on anticoagulant therapy.  She gets her INR monitored outside.\par \par Right femoral area healing normally.

## 2022-03-24 DIAGNOSIS — J45.909 UNSPECIFIED ASTHMA, UNCOMPLICATED: ICD-10-CM

## 2022-03-24 DIAGNOSIS — D68.61 ANTIPHOSPHOLIPID SYNDROME: ICD-10-CM

## 2022-03-24 DIAGNOSIS — Z86.73 PERSONAL HISTORY OF TRANSIENT ISCHEMIC ATTACK (TIA), AND CEREBRAL INFARCTION WITHOUT RESIDUAL DEFICITS: ICD-10-CM

## 2022-03-24 DIAGNOSIS — Z20.822 CONTACT WITH AND (SUSPECTED) EXPOSURE TO COVID-19: ICD-10-CM

## 2022-03-24 DIAGNOSIS — I73.00 RAYNAUD'S SYNDROME WITHOUT GANGRENE: ICD-10-CM

## 2022-03-24 DIAGNOSIS — Z79.01 LONG TERM (CURRENT) USE OF ANTICOAGULANTS: ICD-10-CM

## 2022-03-24 DIAGNOSIS — K21.9 GASTRO-ESOPHAGEAL REFLUX DISEASE WITHOUT ESOPHAGITIS: ICD-10-CM

## 2022-03-24 DIAGNOSIS — M79.7 FIBROMYALGIA: ICD-10-CM

## 2022-03-24 DIAGNOSIS — R07.9 CHEST PAIN, UNSPECIFIED: ICD-10-CM

## 2022-03-24 DIAGNOSIS — R94.39 ABNORMAL RESULT OF OTHER CARDIOVASCULAR FUNCTION STUDY: ICD-10-CM

## 2022-03-24 DIAGNOSIS — E03.9 HYPOTHYROIDISM, UNSPECIFIED: ICD-10-CM

## 2022-03-24 DIAGNOSIS — M32.9 SYSTEMIC LUPUS ERYTHEMATOSUS, UNSPECIFIED: ICD-10-CM

## 2022-03-25 ENCOUNTER — APPOINTMENT (OUTPATIENT)
Dept: CARDIOLOGY | Facility: CLINIC | Age: 57
End: 2022-03-25

## 2022-03-31 ENCOUNTER — APPOINTMENT (OUTPATIENT)
Dept: CARDIOLOGY | Facility: CLINIC | Age: 57
End: 2022-03-31

## 2022-04-07 DIAGNOSIS — I10 ESSENTIAL (PRIMARY) HYPERTENSION: ICD-10-CM

## 2022-04-07 DIAGNOSIS — R07.9 CHEST PAIN, UNSPECIFIED: ICD-10-CM

## 2022-04-07 DIAGNOSIS — M32.9 SYSTEMIC LUPUS ERYTHEMATOSUS, UNSPECIFIED: ICD-10-CM

## 2022-04-08 DIAGNOSIS — M32.9 SYSTEMIC LUPUS ERYTHEMATOSUS, UNSPECIFIED: ICD-10-CM

## 2022-04-08 DIAGNOSIS — R07.9 CHEST PAIN, UNSPECIFIED: ICD-10-CM

## 2022-04-08 DIAGNOSIS — I10 ESSENTIAL (PRIMARY) HYPERTENSION: ICD-10-CM

## 2022-04-13 ENCOUNTER — OUTPATIENT (OUTPATIENT)
Dept: OUTPATIENT SERVICES | Facility: HOSPITAL | Age: 57
LOS: 1 days | End: 2022-04-13

## 2022-04-13 ENCOUNTER — RX RENEWAL (OUTPATIENT)
Age: 57
End: 2022-04-13

## 2022-04-13 DIAGNOSIS — G45.9 TRANSIENT CEREBRAL ISCHEMIC ATTACK, UNSPECIFIED: ICD-10-CM

## 2022-04-15 ENCOUNTER — APPOINTMENT (OUTPATIENT)
Dept: MRI IMAGING | Facility: CLINIC | Age: 57
End: 2022-04-15
Payer: COMMERCIAL

## 2022-04-15 PROCEDURE — 73721 MRI JNT OF LWR EXTRE W/O DYE: CPT | Mod: RT

## 2022-06-09 ENCOUNTER — APPOINTMENT (OUTPATIENT)
Dept: RHEUMATOLOGY | Facility: CLINIC | Age: 57
End: 2022-06-09
Payer: COMMERCIAL

## 2022-06-09 VITALS
BODY MASS INDEX: 29.82 KG/M2 | HEART RATE: 64 BPM | WEIGHT: 190 LBS | HEIGHT: 67 IN | OXYGEN SATURATION: 98 % | DIASTOLIC BLOOD PRESSURE: 90 MMHG | TEMPERATURE: 99.3 F | SYSTOLIC BLOOD PRESSURE: 150 MMHG

## 2022-06-09 DIAGNOSIS — R93.89 ABNORMAL FINDINGS ON DIAGNOSTIC IMAGING OF OTHER SPECIFIED BODY STRUCTURES: ICD-10-CM

## 2022-06-09 DIAGNOSIS — K21.9 GASTRO-ESOPHAGEAL REFLUX DISEASE W/OUT ESOPHAGITIS: ICD-10-CM

## 2022-06-09 PROCEDURE — 99205 OFFICE O/P NEW HI 60 MIN: CPT

## 2022-06-09 NOTE — ASSESSMENT
[FreeTextEntry1] : 56 y/o female w/ SLE, APLS, Hashimoto’s, GERD, asthma referred to rheumatology for management of SLE and APLS. \par Pt had multiple TIAs diagnosed with APLS, now treated with Coumadin. Pt was diagnosed with SLE in age 33.\par Pt reports joint pains, oral ulcers, rashes of hands, ankles, feet, dry eyes, dry mouth, dry skin, pericarditis, Raynaud's.\par Pt has ligament tear of hips with R hip trochanteric bursitis. Pt has undergone PT.\par Pt had COVID infection in 1/2022 with sciatica, fever, loss of taste/smell. Pt had J&J vaccine with recurrence of pericarditis treated with colchicine and steroids. 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 Pt had peptic ulcer in past and is on anticoagulation would avoid NSAIDs.\par Pt has had Hx of angioedema, multiple medication allergies, anaphylaxis\par Patient is on HCQ 200mg PO BID for SLE (since age 33), Coumadin for APLS, and amitriptyline 20mg QHS for migraines.\par Pt had cardiac cath 4/2021 which was non-obstructive.\par Pt is changing provider for change in insurance. Last seen by rheumatology in 7/2021.\par Sister - ITP, Mother - Sweet syndrome\par \par Pt has SLE by history based on positive RICKI, APLS, joint pains, oral ulcers, rash, sicca symptoms, pericarditis, Raynaud's. Pt has extensive allergic reactions and side effects to medications and would minimize addition of any medications unless absolutely necessary.\par \par - Obtain labwork to characterize pt's SLE and APLS, medication safety.\par - Obtain Thyroid US to follow up abnormal findings on US from 2020. Pt's Hashimoto's is reportedly under control.\par - c/w HCQ 200mg PO BID. HCQ dosage is <5mg/kg/day or <400mg/day to minimize risk of retinal toxicity.\par Side effects of HCQ were discussed with the patient including but not limited to GI upset, retinal toxicity, QT prolongation, cytopenias, myopathy. Discussed the importance of yearly ophthalmology evaluation.\par - Discussed that potential additional medications based on any organ involvement or refractory symptoms include AZA, MTX, Benlysta. 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

## 2022-06-09 NOTE — HISTORY OF PRESENT ILLNESS
[FreeTextEntry1] : 56 y/o female w/ SLE, APLS, Hashimoto’s, GERD, asthma referred to rheumatology for management of SLE and APLS. \par Pt had multiple TIAs diagnosed with APLS, now treated with Coumadin. Pt was diagnosed with SLE in age 33.\par Pt reports joint pains, oral ulcers, rashes of hands, ankles, feet, dry eyes, dry mouth, dry skin, pericarditis, Raynaud's.\par Pt has ligament tear of hips with R hip trochanteric bursitis. Pt has undergone PT.\par Pt had COVID infection in 1/2022 with sciatica, fever, loss of taste/smell. Pt had J&J vaccine with recurrence of pericarditis treated with colchicine and steroids. 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 Pt had peptic ulcer in past and is on anticoagulation would avoid NSAIDs.\par Pt has had Hx of angioedema, multiple medication allergies, anaphylaxis\par Patient is on HCQ 200mg PO BID for SLE (since age 33), Coumadin for APLS, and amitriptyline 20mg QHS for migraines.\par Pt had cardiac cath 4/2021.\par Pt is changing provider for change in insurance. Last seen by rheumatology in 7/2021.\par \par Sister - ITP, Mother - Sweet syndrome\par \par

## 2022-06-15 ENCOUNTER — APPOINTMENT (OUTPATIENT)
Dept: ULTRASOUND IMAGING | Facility: CLINIC | Age: 57
End: 2022-06-15
Payer: COMMERCIAL

## 2022-06-15 PROCEDURE — 76536 US EXAM OF HEAD AND NECK: CPT

## 2022-06-23 ENCOUNTER — LABORATORY RESULT (OUTPATIENT)
Age: 57
End: 2022-06-23

## 2022-06-24 LAB
25(OH)D3 SERPL-MCNC: 24 NG/ML
ALBUMIN SERPL ELPH-MCNC: 5 G/DL
ALP BLD-CCNC: 73 U/L
ALT SERPL-CCNC: 17 U/L
ANION GAP SERPL CALC-SCNC: 12 MMOL/L
APPEARANCE: CLEAR
AST SERPL-CCNC: 24 U/L
BASOPHILS # BLD AUTO: 0.1 K/UL
BASOPHILS NFR BLD AUTO: 1.5 %
BILIRUB SERPL-MCNC: 0.4 MG/DL
BILIRUBIN URINE: NEGATIVE
BLOOD URINE: NEGATIVE
BUN SERPL-MCNC: 13 MG/DL
C3 SERPL-MCNC: 112 MG/DL
C4 SERPL-MCNC: 24 MG/DL
CALCIUM SERPL-MCNC: 9.5 MG/DL
CHLORIDE SERPL-SCNC: 101 MMOL/L
CK SERPL-CCNC: 118 U/L
CO2 SERPL-SCNC: 28 MMOL/L
COLOR: YELLOW
CREAT SERPL-MCNC: 0.79 MG/DL
CREAT SPEC-SCNC: 40 MG/DL
CREAT/PROT UR: 0.1 RATIO
CRP SERPL-MCNC: <3 MG/L
DEPRECATED KAPPA LC FREE/LAMBDA SER: 1.29 RATIO
DIRECT COOMBS: NORMAL
EGFR: 86 ML/MIN/1.73M2
EOSINOPHIL # BLD AUTO: 0.34 K/UL
EOSINOPHIL NFR BLD AUTO: 5.2 %
ERYTHROCYTE [SEDIMENTATION RATE] IN BLOOD BY WESTERGREN METHOD: 15 MM/HR
FERRITIN SERPL-MCNC: 64 NG/ML
FOLATE SERPL-MCNC: 11.5 NG/ML
GLUCOSE QUALITATIVE U: NEGATIVE
GLUCOSE SERPL-MCNC: 95 MG/DL
HBV CORE IGG+IGM SER QL: NONREACTIVE
HBV SURFACE AB SER QL: REACTIVE
HBV SURFACE AG SER QL: NONREACTIVE
HCT VFR BLD CALC: 43.1 %
HCV AB SER QL: NONREACTIVE
HCV S/CO RATIO: 0.2 S/CO
HGB BLD-MCNC: 14.3 G/DL
IGA SER QL IEP: 160 MG/DL
IGG SER QL IEP: 924 MG/DL
IGM SER QL IEP: 174 MG/DL
IMM GRANULOCYTES NFR BLD AUTO: 0.3 %
INR PPP: 1.62 RATIO
IRON SATN MFR SERPL: 26 %
IRON SERPL-MCNC: 83 UG/DL
KAPPA LC CSF-MCNC: 1.71 MG/DL
KAPPA LC SERPL-MCNC: 2.2 MG/DL
KETONES URINE: NEGATIVE
LEUKOCYTE ESTERASE URINE: NEGATIVE
LYMPHOCYTES # BLD AUTO: 1.3 K/UL
LYMPHOCYTES NFR BLD AUTO: 20 %
MAN DIFF?: NORMAL
MCHC RBC-ENTMCNC: 31.7 PG
MCHC RBC-ENTMCNC: 33.2 GM/DL
MCV RBC AUTO: 95.6 FL
MONOCYTES # BLD AUTO: 0.57 K/UL
MONOCYTES NFR BLD AUTO: 8.8 %
NEUTROPHILS # BLD AUTO: 4.17 K/UL
NEUTROPHILS NFR BLD AUTO: 64.2 %
NITRITE URINE: NEGATIVE
PH URINE: 7
PLATELET # BLD AUTO: 223 K/UL
POTASSIUM SERPL-SCNC: 3.6 MMOL/L
PROT SERPL-MCNC: 7.3 G/DL
PROT UR-MCNC: 5 MG/DL
PROTEIN URINE: NEGATIVE
PT BLD: 19.1 SEC
RBC # BLD: 4.51 M/UL
RBC # FLD: 12.8 %
RHEUMATOID FACT SER QL: <10 IU/ML
SODIUM SERPL-SCNC: 141 MMOL/L
SPECIFIC GRAVITY URINE: 1.01
T3 SERPL-MCNC: 85 NG/DL
T3FREE SERPL-MCNC: 2.59 PG/ML
T4 FREE SERPL-MCNC: 1.7 NG/DL
T4 SERPL-MCNC: 9.4 UG/DL
THYROGLOB AB SERPL-ACNC: <20 IU/ML
THYROPEROXIDASE AB SERPL IA-ACNC: 44.6 IU/ML
TIBC SERPL-MCNC: 315 UG/DL
TSH SERPL-ACNC: 1.19 UIU/ML
UIBC SERPL-MCNC: 233 UG/DL
UROBILINOGEN URINE: NORMAL
VIT B12 SERPL-MCNC: 512 PG/ML
WBC # FLD AUTO: 6.5 K/UL

## 2022-06-26 LAB
ANA SER IF-ACNC: NEGATIVE
ENA JO1 AB SER IA-ACNC: <0.2 AL
ENA RNP AB SER IA-ACNC: 0.3 AL
ENA SCL70 IGG SER IA-ACNC: <0.2 AL
ENA SM AB SER IA-ACNC: <0.2 AL
ENA SS-A AB SER IA-ACNC: <0.2 AL
ENA SS-B AB SER IA-ACNC: <0.2 AL

## 2022-06-27 LAB
ALDOLASE SERPL-CCNC: 5.6 U/L
DSDNA AB SER-ACNC: 22 IU/ML

## 2022-06-28 LAB
CCP AB SER IA-ACNC: <8 UNITS
HISTONE AB SER QL: 0.9 UNITS
RF+CCP IGG SER-IMP: NEGATIVE

## 2022-06-29 LAB
TPMT ENZYME INTERPRETATION: NORMAL
TPMT ENZYME METHODOLOGY: NORMAL
TPMT ENZYME: 18.7

## 2022-07-01 LAB — INR PPP: 1.62

## 2022-07-08 ENCOUNTER — LABORATORY RESULT (OUTPATIENT)
Age: 57
End: 2022-07-08

## 2022-07-15 ENCOUNTER — APPOINTMENT (OUTPATIENT)
Dept: ULTRASOUND IMAGING | Facility: CLINIC | Age: 57
End: 2022-07-15

## 2022-07-15 ENCOUNTER — APPOINTMENT (OUTPATIENT)
Dept: MAMMOGRAPHY | Facility: CLINIC | Age: 57
End: 2022-07-15

## 2022-07-15 ENCOUNTER — RESULT REVIEW (OUTPATIENT)
Age: 57
End: 2022-07-15

## 2022-07-15 PROCEDURE — G0279: CPT

## 2022-07-15 PROCEDURE — 76641 ULTRASOUND BREAST COMPLETE: CPT | Mod: 50

## 2022-07-15 PROCEDURE — 77066 DX MAMMO INCL CAD BI: CPT

## 2022-07-27 ENCOUNTER — APPOINTMENT (OUTPATIENT)
Dept: BREAST CENTER | Facility: CLINIC | Age: 57
End: 2022-07-27

## 2022-07-27 VITALS — TEMPERATURE: 97.2 F | SYSTOLIC BLOOD PRESSURE: 145 MMHG | HEART RATE: 75 BPM | DIASTOLIC BLOOD PRESSURE: 84 MMHG

## 2022-07-27 DIAGNOSIS — N60.19 DIFFUSE CYSTIC MASTOPATHY OF UNSPECIFIED BREAST: ICD-10-CM

## 2022-07-27 DIAGNOSIS — Z91.89 OTHER SPECIFIED PERSONAL RISK FACTORS, NOT ELSEWHERE CLASSIFIED: ICD-10-CM

## 2022-07-27 PROCEDURE — 99213 OFFICE O/P EST LOW 20 MIN: CPT

## 2022-07-27 NOTE — ASSESSMENT
[FreeTextEntry1] : Pt is a 57 year old white female referred for consultation by Rachel Coffey MD for abnormal mmg. \par Pt was a former pt of mine 11 years ago at [facility]. \par She recently moved back from [place] and works at [employer]. \par \par Pt denies any breast lesions, discharge or masses. Has some breast tenderness, nonspecific, occasional.\par 7/15/22 Fior, bilatmmg , TC 7.2% Fatty breasts, stable 6 mm nodule in lateral left breast, bilat u/s, right negative, 7 mm cyst. Br2. \par Fhx: mGreat Aunt w/ Breast at 70, mGreat Aunt w/ Ovarian at ? age. Pt's MGM had 3 sisters.\par \par CBE: ELLIOT, well healed scar on right UIQ. NO lymphadenopathy. \par  \par Reviewed studies. MMG and u/s negative. Discussed minimal FCC, breasts mostly fatty, u/s not needed for screening. Also pt to consider out of pocket for genetic testing with [facility] since not covered by insurance with [employer]. \par 7/23 bilat mmg and u/s and then f.u after with NP. F/u sooner if breast issues.

## 2022-07-27 NOTE — CONSULT LETTER
[Dear  ___] : Dear  [unfilled], [Consult Letter:] : I had the pleasure of evaluating your patient, [unfilled]. [Referral Closing:] : Thank you very much for seeing this patient.  If you have any questions, please do not hesitate to contact me. [Sincerely,] : Sincerely, [FreeTextEntry3] : Concetta Herrera MD

## 2022-07-27 NOTE — HISTORY OF PRESENT ILLNESS
[FreeTextEntry1] : Pt is a 57 year old white female referred for consultation by Rachel Coffey MD for abnormal mmg. \par Pt was a former pt of mine 11 years ago at [facility]. \par She recently moved back from [place] and works at [employer]. \par \par Pt denies any breast lesions, discharge or masses. Has some breast tenderness, nonspecific, occasional.\par 7/15/22 Fior, bilatmmg , TC 7.2% Fatty breasts, stable 6 mm nodule in lateral left breast, bilat u/s, right negative, 7 mm cyst. Br2. \par Fhx: mGreat Aunt w/ Breast at 70, mGreat Aunt w/ Ovarian at ? age. Pt's MGM had 3 sisters.\par \par CBE: ELLIOT, well healed scar on right UIQ. NO lymphadenopathy. \par Reviewed studies with pt, stable asymmetry on Left, rec is for 6 mos f.u at same time her annual mmg and u/s will be due. Also discussed genetic testing, pt is eligible given fhx of ovarian cancer. Pt accepts. \par \par \par 7/23 bilat mmg and u/s and then f.u after with NP. F/u sooner if breast issues. \par \par

## 2022-07-27 NOTE — DATA REVIEWED
[FreeTextEntry1] : 7/15/22 Fior, bilatmmg , TC 7.2% Fatty breasts, stable 6 mm nodule in lateral left breast, bilat u/s, right negative, 7 mm cyst. Br2. \par

## 2022-07-27 NOTE — PHYSICAL EXAM
[Normocephalic] : normocephalic [Atraumatic] : atraumatic [Supple] : supple [No Supraclavicular Adenopathy] : no supraclavicular adenopathy [No Thyromegaly] : no thyromegaly [Examined in the supine and seated position] : examined in the supine and seated position [Symmetrical] : symmetrical [Bra Size: ___] : Bra Size: [unfilled] [No dominant masses] : no dominant masses in right breast  [No dominant masses] : no dominant masses left breast [No Nipple Retraction] : no left nipple retraction [No Nipple Discharge] : no left nipple discharge [No Axillary Lymphadenopathy] : no left axillary lymphadenopathy [No Edema] : no edema [No Swelling] : no swelling [Full ROM] : full range of motion [No Rashes] : no rashes [No Ulceration] : no ulceration [de-identified] : UIQ well healed scar.

## 2022-08-04 ENCOUNTER — LABORATORY RESULT (OUTPATIENT)
Age: 57
End: 2022-08-04

## 2022-08-15 ENCOUNTER — APPOINTMENT (OUTPATIENT)
Dept: RHEUMATOLOGY | Facility: CLINIC | Age: 57
End: 2022-08-15

## 2022-08-15 VITALS
HEIGHT: 67 IN | HEART RATE: 67 BPM | WEIGHT: 190 LBS | RESPIRATION RATE: 18 BRPM | BODY MASS INDEX: 29.82 KG/M2 | DIASTOLIC BLOOD PRESSURE: 93 MMHG | TEMPERATURE: 97 F | OXYGEN SATURATION: 99 % | SYSTOLIC BLOOD PRESSURE: 167 MMHG

## 2022-08-15 PROCEDURE — 99214 OFFICE O/P EST MOD 30 MIN: CPT

## 2022-08-15 NOTE — HISTORY OF PRESENT ILLNESS
[FreeTextEntry1] : HISTORY: \par 58 y/o female w/ SLE, APLS, Hashimoto’s, GERD, asthma presents as follow up for management of SLE and APLS. \par Pt had multiple TIAs diagnosed with APLS, now treated with Coumadin. Pt was diagnosed with SLE in age 33. \par Pt reports joint pains, oral ulcers, rashes of hands, ankles, feet, dry eyes, dry mouth, dry skin, pericarditis, Raynaud's. \par Pt has ligament tear of hips with R hip trochanteric bursitis. Pt has undergone PT. \par Pt had COVID infection in 1/2022 with sciatica, fever, loss of taste/smell. Pt had J&J vaccine with recurrence of pericarditis treated with colchicine and steroids. 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 Pt had peptic ulcer in past and is on anticoagulation would avoid NSAIDs. \par Pt has had Hx of angioedema, multiple medication allergies, anaphylaxis \par Patient is on HCQ 200mg PO BID for SLE (since age 33), Coumadin for APLS, and amitriptyline 20mg QHS for migraines.\par Pt had cardiac cath 4/2021 which was non-obstructive. \par Pt is changing provider for change in insurance. Last seen by rheumatology in 7/2021. \par Sister - ITP, Mother - Sweet syndrome \par \par Pt has SLE by history based on positive RICKI, APLS, joint pains, oral ulcers, rash, sicca symptoms, pericarditis, Raynaud's. Pt has extensive allergic reactions and side effects to medications and would minimize addition of any medications unless absolutely necessary. \par \par INTERVAL HISTORY: \par Pt had Hx of pericarditis in [id number]. Pt had pericarditis with J&J COVID-19 vaccine with pericarditis x 6 months and has been on colchicine with improvement. Pt is being followed by osteopath with R hip injection. Pt currently has severe plantar fasciitis, not improved with insoles provided by PCP. Pt has follow up with podiatry next week to discuss.\par \par WORKUP: \par Remarkable for (6/2022): B2GP IgA >150, LA+, TPO Ab+, Vit D 25-OH 24 \par Normal/neg (6/2022): CBC, CMP, ESR, CRP, U/A, UPCR, RICKI, dsDNA, SSA, SSB, Stokes, RNP, histone Ab, ribosomal P Ab, C3, C4, TG Ab, B2GP IgG/IgM, Juanita-1, SCl-70, centromere Ab, ANCAs, Direct Kuldeep, CPK, aldolase, FLC ratio, thyroid panel, TSH, B12, Folate, iron panel, TPMT level, Hep B/C, Quantiferon TB\par

## 2022-08-15 NOTE — ASSESSMENT
[FreeTextEntry1] : 58 y/o female w/ SLE, APLS, Hashimoto’s, GERD, asthma presents as follow up for management of SLE and APLS. \par Pt had multiple TIAs diagnosed with APLS, now treated with Coumadin. Pt was diagnosed with SLE in age 33. \par Pt reports joint pains, oral ulcers, rashes of hands, ankles, feet, dry eyes, dry mouth, dry skin, pericarditis, Raynaud's.\par Pt has ligament tear of hips with R hip trochanteric bursitis and had steroid injection by osteopath in past. Pt has undergone PT. \par Pt had COVID infection in 1/2022 with sciatica, fever, loss of taste/smell. Pt had J&J vaccine with recurrence of pericarditis x 6 months treated with colchicine and steroids. 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 Pt had peptic ulcer in past and is on anticoagulation would avoid NSAIDs. \par Pt has had Hx of angioedema, multiple medication allergies, anaphylaxis \par Patient is on HCQ 200mg PO BID for SLE (since age 33), Coumadin for APLS, and amitriptyline 20mg QHS for migraines.\par Pt had cardiac cath 4/2021 which was non-obstructive. \par Pt is changing provider for change in insurance.\par Sister - ITP, Mother - Sweet syndrome \par \par Pt has SLE by history based on positive RICKI, APLS, joint pains, oral ulcers, rash, sicca symptoms, pericarditis, Raynaud's. Workup by me with B2GP+, LA+, TPO+ and mild low Vit D. Otherwise normal/negative RICKI, RICKI subserologies, inflammatory markers, complements.\par \par Currently, pt's SLE does not have signs of significant flare and pt can be continued on HCQ 200mg PO BID. Addition of AZA can be considered if significant recurrent pericarditis, but  pt has extensive allergic reactions and side effects to medications and would minimize addition of any medications unless absolutely necessary.\par \par - c/w HCQ 200mg PO BID. HCQ dosage is <5mg/kg/day or <400mg/day to minimize risk of retinal toxicity. \par Side effects of HCQ were discussed with the patient including but not limited to GI upset, retinal toxicity, QT prolongation, cytopenias, myopathy. Discussed the importance of yearly ophthalmology evaluation.\par - Advised to start Vit D 2000IU daily for mild low vit D.\par - Discussed that potential additional medications based on any organ involvement or refractory symptoms include AZA, MTX, Benlysta. 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 \par - APLS management per other specialists with coumadin, agree with current management. \par - US thyroid with stable L uninodular gland. To be followed up with PCP. \par - RTC 3 months for follow up. \par

## 2022-08-18 ENCOUNTER — APPOINTMENT (OUTPATIENT)
Dept: PODIATRY | Facility: CLINIC | Age: 57
End: 2022-08-18

## 2022-08-18 VITALS — BODY MASS INDEX: 29.82 KG/M2 | WEIGHT: 190 LBS | HEIGHT: 67 IN

## 2022-08-18 PROCEDURE — 99203 OFFICE O/P NEW LOW 30 MIN: CPT | Mod: 25

## 2022-08-18 PROCEDURE — 20550 NJX 1 TENDON SHEATH/LIGAMENT: CPT | Mod: LT

## 2022-08-18 PROCEDURE — 73630 X-RAY EXAM OF FOOT: CPT | Mod: LT

## 2022-08-18 NOTE — PHYSICAL EXAM
[NL 30)] : inversion 30 degrees [NL (40)] : MTP joint DF 40 degrees [NL (20)] : MTP joint PF 20 degrees [5___] : [facility] 5[unfilled]/5 [2+] : posterior tibialis pulse: 2+ [Normal] : saphenous nerve sensation normal [Left] : left foot [There are no fractures, subluxations or dislocations. No significant abnormalities are seen] : There are no fractures, subluxations or dislocations. No significant abnormalities are seen [] : non-antalgic [FreeTextEntry3] : none [FreeTextEntry8] : plantar fascial pain

## 2022-08-18 NOTE — DISCUSSION/SUMMARY
[Medication Risks Reviewed] : Medication risks reviewed [Surgical risks reviewed] : Surgical risks reviewed [de-identified] : TREATMENT WAS DISCUSSED TODAY SUCH AS NSAIDS OF CHOICE, TYLENOL, STRETCHING, TOPICAL VOLTAREN, ARNICA, YOGA STRETCHING.\par STEROID INJECTIONS TO REDUCE PAIN AND SWELLING. \par PHYSICAL THERAPY.\par OTC ORTHOTICS AND OR CUSTOM ORTHOTICS\par I DISCUSSED SURGERY ,BUT NOT NEEDED AT THIS TIME.\par

## 2022-09-07 ENCOUNTER — APPOINTMENT (OUTPATIENT)
Dept: PODIATRY | Facility: CLINIC | Age: 57
End: 2022-09-07

## 2022-09-07 PROCEDURE — 99213 OFFICE O/P EST LOW 20 MIN: CPT | Mod: 25

## 2022-09-07 PROCEDURE — 20550 NJX 1 TENDON SHEATH/LIGAMENT: CPT

## 2022-09-07 NOTE — PHYSICAL EXAM
[Left] : left foot and ankle [NL 30)] : inversion 30 degrees [NL (40)] : MTP joint DF 40 degrees [NL (20)] : MTP joint PF 20 degrees [5___] : [facility] 5[unfilled]/5 [2+] : posterior tibialis pulse: 2+ [Normal] : saphenous nerve sensation normal [] : not mildly antalgic [FreeTextEntry3] : none

## 2022-09-07 NOTE — ASSESSMENT
[FreeTextEntry1] : TREATMENT WAS DISCUSSED TODAY SUCH AS NSAIDS OF CHOICE, TYLENOL, STRETCHING, TOPICAL VOLTAREN, ARNICA, YOGA STRETCHING.\par STEROID INJECTIONS TO REDUCE PAIN AND SWELLING. \par PHYSICAL THERAPY.\par OTC ORTHOTICS AND OR CUSTOM ORTHOTICS\par I DISCUSSED SURGERY ,BUT NOT NEEDED AT THIS TIME.\par APPLIED TRIAMCINOLONE 40 MG TODAY WITH 10 MG OF LIDOCAINE TO LEFT HEEL TO REDUCE PAIN AND SWELLING.  done

## 2022-09-13 ENCOUNTER — OUTPATIENT (OUTPATIENT)
Dept: OUTPATIENT SERVICES | Facility: HOSPITAL | Age: 57
LOS: 1 days | End: 2022-09-13

## 2022-09-13 DIAGNOSIS — Z00.00 ENCOUNTER FOR GENERAL ADULT MEDICAL EXAMINATION WITHOUT ABNORMAL FINDINGS: ICD-10-CM

## 2022-09-18 ENCOUNTER — NON-APPOINTMENT (OUTPATIENT)
Age: 57
End: 2022-09-18

## 2022-09-19 LAB
INR PPP: 3.1
PT BLD: 35.2

## 2022-09-21 ENCOUNTER — NON-APPOINTMENT (OUTPATIENT)
Age: 57
End: 2022-09-21

## 2022-09-28 ENCOUNTER — LABORATORY RESULT (OUTPATIENT)
Age: 57
End: 2022-09-28

## 2022-10-06 ENCOUNTER — APPOINTMENT (OUTPATIENT)
Dept: CARDIOLOGY | Facility: CLINIC | Age: 57
End: 2022-10-06

## 2022-10-06 ENCOUNTER — NON-APPOINTMENT (OUTPATIENT)
Age: 57
End: 2022-10-06

## 2022-10-06 VITALS
OXYGEN SATURATION: 99 % | DIASTOLIC BLOOD PRESSURE: 72 MMHG | HEART RATE: 72 BPM | BODY MASS INDEX: 29.98 KG/M2 | HEIGHT: 67 IN | WEIGHT: 191 LBS | SYSTOLIC BLOOD PRESSURE: 116 MMHG

## 2022-10-06 PROCEDURE — 99215 OFFICE O/P EST HI 40 MIN: CPT

## 2022-10-06 PROCEDURE — 93000 ELECTROCARDIOGRAM COMPLETE: CPT

## 2022-10-06 RX ORDER — LEVOTHYROXINE SODIUM 175 UG/1
175 TABLET ORAL DAILY
Refills: 0 | Status: ACTIVE | COMMUNITY

## 2022-10-06 RX ORDER — OXYCODONE AND ACETAMINOPHEN 5; 325 MG/1; MG/1
5-325 TABLET ORAL
Refills: 0 | Status: DISCONTINUED | COMMUNITY
End: 2022-10-06

## 2022-10-06 NOTE — CARDIOLOGY SUMMARY
[de-identified] : 10/6/2022, NSR, normal ECG [de-identified] : 3/17/2022, LV EF 60-65%, trace MR, ascending aorta 4.1cm, trace-mild TR with estimated PASP of 26mmHg.\par 2/12/21: EF 60-65%, minimal MR, normal LA size, ascending aorta 4.0 cm, minimal TR, minimal PI. [de-identified] : 3/18/2022, Normal coronary arteries.

## 2022-10-06 NOTE — PHYSICAL EXAM
[Well Developed] : well developed [Well Nourished] : well nourished [No Acute Distress] : no acute distress [Normal Conjunctiva] : normal conjunctiva [Normal S1, S2] : normal S1, S2 [No Murmur] : no murmur [No Rub] : no rub [No Gallop] : no gallop [Clear Lung Fields] : clear lung fields [Good Air Entry] : good air entry [No Respiratory Distress] : no respiratory distress  [Normal Bowel Sounds] : normal bowel sounds [Normal] : moves all extremities, no focal deficits, normal speech [Alert and Oriented] : alert and oriented [Normal memory] : normal memory [de-identified] : No carotid bruits auscultated bilaterally

## 2022-10-06 NOTE — DISCUSSION/SUMMARY
[FreeTextEntry1] : 1. HTN - controlled, continue losartan 50 mg PO daily\par 2. Aortic aneurysm -  stable size of 4.1 cm without change.  Continue periodic surveillance.\par 3. APLS - on Coumadin (high risk medication with no signs of toxicity) with an INR goal of 2.0-3.0; very well controlled\par 4. ?Pericarditis - no episodes since seeing me last. \par 5. chest pain/dyspnea: resolved. Normal LV systolic function and no significant valvular disease or pericardial effusion on March 2022 echocardiogram. Normal coronary arteries on catheterization from March 2022.\par \par Follow up in 6 months.\par \par \par \par  [EKG obtained to assist in diagnosis and management of assessed problem(s)] : EKG obtained to assist in diagnosis and management of assessed problem(s)

## 2022-10-06 NOTE — HISTORY OF PRESENT ILLNESS
[FreeTextEntry1] : Historical Perspective:\par 57F w/ PMH of SLE with recurrent pericarditis, hypothyroidism, APLS on coumadin, asthma presenting to establish care with cardiology.  She has had many bout of recurrent pericarditis most related to SLE flares.  She has been on and off colchicine as well as steroids in the past.  She denies angina chest pain or significant SOB.  She has her INR monitored by venipuncture as her finger sticks have been unreliable.\par \par Current Health Status:\par Patient with no chest pain, SOB, or palpitations. No hospitalizations since seeing me last. Remains compliant with his medications and reports no adverse effects.\par \par \par

## 2022-10-17 ENCOUNTER — NON-APPOINTMENT (OUTPATIENT)
Age: 57
End: 2022-10-17

## 2022-10-19 ENCOUNTER — NON-APPOINTMENT (OUTPATIENT)
Age: 57
End: 2022-10-19

## 2022-10-24 ENCOUNTER — NON-APPOINTMENT (OUTPATIENT)
Age: 57
End: 2022-10-24

## 2022-10-25 ENCOUNTER — APPOINTMENT (OUTPATIENT)
Dept: CARDIOLOGY | Facility: CLINIC | Age: 57
End: 2022-10-25

## 2022-10-25 ENCOUNTER — NON-APPOINTMENT (OUTPATIENT)
Age: 57
End: 2022-10-25

## 2022-10-25 VITALS
OXYGEN SATURATION: 99 % | HEIGHT: 67 IN | TEMPERATURE: 98.06 F | DIASTOLIC BLOOD PRESSURE: 68 MMHG | SYSTOLIC BLOOD PRESSURE: 120 MMHG | WEIGHT: 191 LBS | BODY MASS INDEX: 29.98 KG/M2

## 2022-10-25 DIAGNOSIS — R01.1 CARDIAC MURMUR, UNSPECIFIED: ICD-10-CM

## 2022-10-25 PROCEDURE — 99214 OFFICE O/P EST MOD 30 MIN: CPT

## 2022-10-25 PROCEDURE — 93306 TTE W/DOPPLER COMPLETE: CPT

## 2022-10-25 RX ORDER — FLUOROMETHOLONE 1 MG/ML
0.1 SOLUTION/ DROPS OPHTHALMIC
Qty: 5 | Refills: 0 | Status: DISCONTINUED | COMMUNITY
Start: 2019-08-04 | End: 2022-10-25

## 2022-10-28 LAB — INR PPP: 2.1

## 2022-11-01 ENCOUNTER — APPOINTMENT (OUTPATIENT)
Dept: RADIOLOGY | Facility: CLINIC | Age: 57
End: 2022-11-01

## 2022-11-01 PROCEDURE — 71101 X-RAY EXAM UNILAT RIBS/CHEST: CPT | Mod: RT

## 2022-11-02 ENCOUNTER — APPOINTMENT (OUTPATIENT)
Dept: PODIATRY | Facility: CLINIC | Age: 57
End: 2022-11-02

## 2022-11-16 ENCOUNTER — APPOINTMENT (OUTPATIENT)
Dept: RHEUMATOLOGY | Facility: CLINIC | Age: 57
End: 2022-11-16

## 2022-11-16 ENCOUNTER — LABORATORY RESULT (OUTPATIENT)
Age: 57
End: 2022-11-16

## 2022-11-16 DIAGNOSIS — E06.3 AUTOIMMUNE THYROIDITIS: ICD-10-CM

## 2022-11-16 PROCEDURE — 99214 OFFICE O/P EST MOD 30 MIN: CPT

## 2022-11-16 NOTE — ASSESSMENT
[FreeTextEntry1] : 56 y/o female w/ SLE, APLS, Hashimoto’s, GERD, asthma presents as follow up for management of SLE and APLS. Pt had multiple TIAs diagnosed with APLS, now treated with Coumadin. Pt was diagnosed with SLE in age 33.  \par Pt reports joint pains, oral ulcers, rashes of hands, ankles, feet, dry eyes, dry mouth, dry skin, pericarditis, Raynaud's. \par Pt has ligament tear of hips with R hip trochanteric bursitis and had steroid injection by osteopath in past. Pt has undergone PT.  \par Pt had COVID infection in 1/2022 with sciatica, fever, loss of taste/smell. Pt had J&J vaccine with recurrence of pericarditis x 6 months treated with colchicine and steroids. 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 Pt had peptic ulcer in past and is on anticoagulation would avoid NSAIDs.  \par Pt has had Hx of angioedema, multiple medication allergies, anaphylaxis  \par Patient is on HCQ 200mg PO BID for SLE (since age 33), Coumadin for APLS, and amitriptyline 20mg QHS for migraines. \par Pt had cardiac cath 4/2021 which was non-obstructive.  \par Pt is changing provider for change in insurance. \par Sister - ITP, Mother - Sweet syndrome  \par \par Pt has SLE by history based on positive RICKI, APLS, joint pains, oral ulcers, rash, sicca symptoms, pericarditis, Raynaud's. Workup by me with B2GP+, LA+, TPO+ and mild low Vit D. Otherwise normal/negative RICKI, RICKI subserologies, inflammatory markers, complements.\par \par Pt currently has recurrence of pericarditis after severe URI associated with back of rib pain, oral ulcers. Pt likely having lupus activity and inflammatory activity as reaction to URI.\par We would like to avoid steroids due to osteopenia, AVN scare in past, peptic ulcer. We would like to avoid high dose NSAIDs due to pt's anticoagulation and peptic ulcer.\par Pt was previously treated with MMF in 2012 for refractory pericarditis.\par \par - Obtain labwork for SLE activity (inflammatory markers likely high in setting of recent URI and resulting flare)\par - For now, agree with colchicine 0.6mg PO daily. Can consider increasing to BID if refractory. Colchicine should help with her rib pain and oral ulcers as well.\par - If pt's pericarditis is refractory for the next few months, will discuss DMARDs such as AZA and MMF as steroid sparing agents for treatment\par - c/w HCQ 200mg PO BID. HCQ dosage is <5mg/kg/day or <400mg/day to minimize risk of retinal toxicity.  \par Side effects of HCQ were discussed with the patient including but not limited to GI upset, retinal toxicity, QT prolongation, cytopenias, myopathy. Discussed the importance of yearly ophthalmology evaluation. \par - Discussed that potential additional medications based on any organ involvement or refractory symptoms include AZA, MTX, Benlysta. 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  \par - APLS management per other specialists with coumadin, agree with current management.  \par - RTC 2 months for follow up. \par

## 2022-11-16 NOTE — HISTORY OF PRESENT ILLNESS
[FreeTextEntry1] : HISTORY: \par 56 y/o female w/ SLE, APLS, Hashimoto’s, GERD, asthma presents as follow up for management of SLE and APLS. Pt had multiple TIAs diagnosed with APLS, now treated with Coumadin. Pt was diagnosed with SLE in age 33.  \par Pt reports joint pains, oral ulcers, rashes of hands, ankles, feet, dry eyes, dry mouth, dry skin, pericarditis, Raynaud's. \par Pt has ligament tear of hips with R hip trochanteric bursitis and had steroid injection by osteopath in past. Pt has undergone PT.  \par Pt had COVID infection in 1/2022 with sciatica, fever, loss of taste/smell. Pt had J&J vaccine with recurrence of pericarditis x 6 months treated with colchicine and steroids. 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 Pt had peptic ulcer in past and is on anticoagulation would avoid NSAIDs.  \par Pt has had Hx of angioedema, multiple medication allergies, anaphylaxis  \par Patient is on HCQ 200mg PO BID for SLE (since age 33), Coumadin for APLS, and amitriptyline 20mg QHS for migraines. \par Pt had cardiac cath 4/2021 which was non-obstructive.  \par Pt is changing provider for change in insurance. \par Sister - ITP, Mother - Sweet syndrome  \par \par Pt has SLE by history based on positive RICKI, APLS, joint pains, oral ulcers, rash, sicca symptoms, pericarditis, Raynaud's. Workup by me with B2GP+, LA+, TPO+ and mild low Vit D. Otherwise normal/negative RICKI, RICKI subserologies, inflammatory markers, complements. \par \par INTERVAL HISTORY: \par Pt had significant URI (multiple testing negative for COVID) about 2 weeks ago and has pains  the back of the ribs. Pt is back on colchicine 0.6mg daily for pericarditis. Pt used MMF 2012 x 1 year for steroid sparing agent for pericarditis in the past.\par \par WORKUP:  \par Remarkable for (6/2022): B2GP IgA >150, LA+, TPO Ab+, Vit D 25-OH 24  \par Normal/neg (6/2022): CBC, CMP, ESR, CRP, U/A, UPCR, RICKI, dsDNA, SSA, SSB, Stokes, RNP, histone Ab, ribosomal P Ab, C3, C4, TG Ab, B2GP IgG/IgM, Juanita-1, SCl-70, centromere Ab, ANCAs, Direct Kuldeep, CPK, aldolase, FLC ratio, thyroid panel, TSH, B12, Folate, iron panel, TPMT level, Hep B/C, Quantiferon TB

## 2022-11-30 ENCOUNTER — APPOINTMENT (OUTPATIENT)
Dept: ULTRASOUND IMAGING | Facility: CLINIC | Age: 57
End: 2022-11-30

## 2022-11-30 PROCEDURE — 76770 US EXAM ABDO BACK WALL COMP: CPT

## 2022-12-06 ENCOUNTER — NON-APPOINTMENT (OUTPATIENT)
Age: 57
End: 2022-12-06

## 2022-12-12 ENCOUNTER — LABORATORY RESULT (OUTPATIENT)
Age: 57
End: 2022-12-12

## 2022-12-13 LAB
ALBUMIN SERPL ELPH-MCNC: 4.6 G/DL
ALP BLD-CCNC: 70 U/L
ALT SERPL-CCNC: 18 U/L
ANION GAP SERPL CALC-SCNC: 11 MMOL/L
APPEARANCE: CLEAR
AST SERPL-CCNC: 23 U/L
BASOPHILS # BLD AUTO: 0.09 K/UL
BASOPHILS NFR BLD AUTO: 1.8 %
BILIRUB SERPL-MCNC: 0.4 MG/DL
BILIRUBIN URINE: NEGATIVE
BLOOD URINE: NEGATIVE
BUN SERPL-MCNC: 10 MG/DL
C3 SERPL-MCNC: 113 MG/DL
C4 SERPL-MCNC: 24 MG/DL
CALCIUM SERPL-MCNC: 9.8 MG/DL
CHLORIDE SERPL-SCNC: 105 MMOL/L
CO2 SERPL-SCNC: 26 MMOL/L
COLOR: NORMAL
CREAT SERPL-MCNC: 0.79 MG/DL
CREAT SPEC-SCNC: 41 MG/DL
CREAT/PROT UR: 0.1 RATIO
CRP SERPL-MCNC: <3 MG/L
EGFR: 86 ML/MIN/1.73M2
EOSINOPHIL # BLD AUTO: 0.52 K/UL
EOSINOPHIL NFR BLD AUTO: 10.5 %
ERYTHROCYTE [SEDIMENTATION RATE] IN BLOOD BY WESTERGREN METHOD: 26 MM/HR
GLUCOSE QUALITATIVE U: NEGATIVE
GLUCOSE SERPL-MCNC: 97 MG/DL
HCT VFR BLD CALC: 47.7 %
HGB BLD-MCNC: 15.4 G/DL
IMM GRANULOCYTES NFR BLD AUTO: 0.4 %
KETONES URINE: NEGATIVE
LEUKOCYTE ESTERASE URINE: NEGATIVE
LYMPHOCYTES # BLD AUTO: 0.86 K/UL
LYMPHOCYTES NFR BLD AUTO: 17.3 %
MAN DIFF?: NORMAL
MCHC RBC-ENTMCNC: 31.8 PG
MCHC RBC-ENTMCNC: 32.3 GM/DL
MCV RBC AUTO: 98.6 FL
MONOCYTES # BLD AUTO: 0.53 K/UL
MONOCYTES NFR BLD AUTO: 10.7 %
NEUTROPHILS # BLD AUTO: 2.94 K/UL
NEUTROPHILS NFR BLD AUTO: 59.3 %
NITRITE URINE: NEGATIVE
PH URINE: 7
PLATELET # BLD AUTO: 239 K/UL
POTASSIUM SERPL-SCNC: 4.2 MMOL/L
PROT SERPL-MCNC: 6.7 G/DL
PROT UR-MCNC: 5 MG/DL
PROTEIN URINE: NEGATIVE
RBC # BLD: 4.84 M/UL
RBC # FLD: 13.2 %
SODIUM SERPL-SCNC: 142 MMOL/L
SPECIFIC GRAVITY URINE: 1.01
UROBILINOGEN URINE: NORMAL
WBC # FLD AUTO: 4.96 K/UL

## 2022-12-15 LAB — DSDNA AB SER-ACNC: 16 IU/ML

## 2022-12-29 ENCOUNTER — LABORATORY RESULT (OUTPATIENT)
Age: 57
End: 2022-12-29

## 2023-01-06 ENCOUNTER — LABORATORY RESULT (OUTPATIENT)
Age: 58
End: 2023-01-06

## 2023-01-24 ENCOUNTER — APPOINTMENT (OUTPATIENT)
Dept: RHEUMATOLOGY | Facility: CLINIC | Age: 58
End: 2023-01-24

## 2023-01-26 ENCOUNTER — APPOINTMENT (OUTPATIENT)
Dept: PODIATRY | Facility: CLINIC | Age: 58
End: 2023-01-26
Payer: COMMERCIAL

## 2023-01-26 DIAGNOSIS — M79.671 PAIN IN RIGHT FOOT: ICD-10-CM

## 2023-01-26 PROCEDURE — 99213 OFFICE O/P EST LOW 20 MIN: CPT | Mod: 25

## 2023-01-26 PROCEDURE — 20600 DRAIN/INJ JOINT/BURSA W/O US: CPT | Mod: RT

## 2023-01-26 NOTE — PHYSICAL EXAM
[Right] : right foot and ankle [Mild] : mild swelling of MTP joint/great toe [4th] : 4th [NL 30)] : inversion 30 degrees [NL (40)] : MTP joint DF 40 degrees [NL (20)] : MTP joint PF 20 degrees [5___] : [facility] 5[unfilled]/5 [2+] : posterior tibialis pulse: 2+ [Normal] : saphenous nerve sensation normal [] : varicosities are not warm and well perfused

## 2023-01-26 NOTE — ASSESSMENT
[FreeTextEntry1] : DEBRIDEMENT OF LESION 4TH MPJ RIGHT FOOT\par CELESTONE 6 MG APPLIED WITH LIDOCAINE PLAIN 10 MG  TO REDUCE PAIN AND SWELLING\par CELESTONE 2 UNITS, LIDOCAINE PLAIN 2 UNITS\par NSAID OF CHOICE FOR PAIN.\par TYLENOL FOR PAIN\par COLD COMPRESSES.\par \par

## 2023-01-26 NOTE — REASON FOR VISIT
[FreeTextEntry2] : PLANTAR FASCIAL PAIN IS IMPROVED.  INSERTS FEEL BETTER.  RIGHT FOOT HAS A STONE BRUISE OR PAIN WITH WALKING.  LEFT FOOT PINKY TOE RUBBING AND TURNING IN.

## 2023-01-31 ENCOUNTER — APPOINTMENT (OUTPATIENT)
Dept: RHEUMATOLOGY | Facility: CLINIC | Age: 58
End: 2023-01-31
Payer: COMMERCIAL

## 2023-01-31 PROCEDURE — 99215 OFFICE O/P EST HI 40 MIN: CPT

## 2023-01-31 NOTE — HISTORY OF PRESENT ILLNESS
[FreeTextEntry1] : HISTORY: \par 57 y/o female w/ SLE, APLS, Hashimoto’s, GERD, asthma presents as follow up for management of SLE and APLS. Pt had multiple TIAs diagnosed with APLS, now treated with Coumadin. Pt was diagnosed with SLE in age 33.  \par Pt reports joint pains, oral ulcers, rashes of hands, ankles, feet, dry eyes, dry mouth, dry skin, pericarditis, Raynaud's.  \par Pt has ligament tear of hips with R hip trochanteric bursitis and had steroid injection by osteopath in past. Pt has undergone PT.  \par Pt had COVID infection in 1/2022 with sciatica, fever, loss of taste/smell. Pt had J&J vaccine with recurrence of pericarditis x 6 months treated with colchicine and steroids. 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 Pt had peptic ulcer in past and is on anticoagulation would avoid NSAIDs.  \par Pt has had Hx of angioedema, multiple medication allergies, anaphylaxis  \par Patient is on HCQ 200mg PO BID for SLE (since age 33), Coumadin for APLS, and amitriptyline 20mg QHS for migraines.  \par Pt had cardiac cath 4/2021 which was non-obstructive.  \par Pt is changing provider for change in insurance.  \par Sister - ITP, Mother - Sweet syndrome  \par \par Pt has SLE by history based on positive RICKI, APLS, joint pains, oral ulcers, rash, sicca symptoms, pericarditis, Raynaud's. Workup by me with B2GP+, LA+, TPO+ and mild low Vit D. Otherwise normal/negative RICKI, RICKI subserologies, inflammatory markers, complements. \par \par Pt currently has recurrence of pericarditis after severe URI associated with back of rib pain, oral ulcers. Pt likely having lupus activity and inflammatory activity as reaction to URI. \par We would like to avoid steroids due to osteopenia, AVN scare in past, peptic ulcer. We would like to avoid high dose NSAIDs due to pt's anticoagulation and peptic ulcer. \par Pt was previously treated with MMF in 2012 for refractory pericarditis. \par \par INTERVAL HISTORY: \par In 12/2022, pt had COVID infection with lupus flare symptoms including rash, finger swelling, pain, oral ulcers. Pt was given Magic Mouthwash for oral ulcers. Steroid treatment was deferred due to active infection. \par Pt had foot steroid injection by podiatry last week. Pt was given clindamycin for prophylaxis for dental procedure and had esophageal spasms, started on Tums and Prilosac.\par [place] [facility] [place] [facility] [place] for left sided facial swelling, eye pain, dizziness, lightheadedness, throat irritation, ?slurred speech in setting of recent dental procedure. Pt had labs, CT head, and treated with Benadryl and Solumed in ED. Workup was unremarkable and patient felt better so pt was discharged. Pt advised to follow up with rheumatology. [place] [facility] [place] eosinophils (seen in past labs), normal/neg rest of CBC, CMP, trop, therapeutic INR (2.2). CXR and CT head normal.\par Pt reports improvement in her overall symptoms and swelling since hospitalization but reports continuing swelling of inner L eyelid and overall malaise. Pt's pericarditis has not flared up so far.\par \par WORKUP:  \par Remarkable for (6/2022): B2GP IgA >150, LA+, TPO Ab+, Vit D 25-OH 24  \par Normal/neg (6/2022): CBC, CMP, ESR, CRP, U/A, UPCR, RICKI, dsDNA, SSA, SSB, Stokes, RNP, histone Ab, ribosomal P Ab, C3, C4, TG Ab, B2GP IgG/IgM, Juanita-1, SCl-70, centromere Ab, ANCAs, Direct Kuldeep, CPK, aldolase, FLC ratio, thyroid panel, TSH, B12, Folate, iron panel, TPMT level, Hep B/C, Quantiferon TB

## 2023-01-31 NOTE — ASSESSMENT
[FreeTextEntry1] : 59 y/o female w/ SLE, APLS, Hashimoto’s, GERD, asthma presents as follow up for management of SLE and APLS. Pt had multiple TIAs diagnosed with APLS, now treated with Coumadin. Pt was diagnosed with SLE in age 33.  \par Pt reports joint pains, oral ulcers, rashes of hands, ankles, feet, dry eyes, dry mouth, dry skin, pericarditis, Raynaud's.  \par Pt has ligament tear of hips with R hip trochanteric bursitis and had steroid injection by osteopath in past. Pt has undergone PT.  \par Pt had COVID infection in 1/2022 with sciatica, fever, loss of taste/smell. Pt had J&J vaccine with recurrence of pericarditis x 6 months treated with colchicine and steroids. 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 Pt had peptic ulcer in past and is on anticoagulation would avoid NSAIDs.  \par Pt has had Hx of angioedema, multiple medication allergies, anaphylaxis  \par Patient is on HCQ 200mg PO BID for SLE (since age 33), Coumadin for APLS, and amitriptyline 20mg QHS for migraines.  \par Pt had cardiac cath 4/2021 which was non-obstructive.  \par Pt is changing provider for change in insurance.  \par Sister - ITP, Mother - Sweet syndrome  \par \par Pt has SLE by history based on positive RICKI, APLS, joint pains, oral ulcers, rash, sicca symptoms, pericarditis, Raynaud's. Workup by me with B2GP+, LA+, TPO+ and mild low Vit D. Otherwise normal/negative RICKI, RICKI subserologies, inflammatory markers, complements. \par \par Pt has had pericarditis recurrence which was treated with colchicine. Pt's pericarditis is currently under control.  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 \par \par In 12/2022 and 1/2023, pt had COVID infection, reaction to flu vaccine, and allergic reaction to clindamycin which have all contributed to flare of her underlying autoimmune disease. [place] [facility] [place] and TIA was ruled out. Pt received solumedrol and Benedryl with improvement but continues to have lingering symptoms.\par \par Discussed with pt about choices of treatment. We would like to avoid steroids due to osteopenia, AVN scare in past, peptic ulcer, and risk of recurrence of pericarditis. We will try medrol dose as short course to help alleviate current flare while minimizing risk of side effects. Pt can consider ophthalmology follow up with L eyelid swelling not improved with steroid course.\par \par - Pt had labwork done in [facility] few days ago. No need for additional labwork today. \par - Rx medrol pack as above\par - Pt is off colchicine after resolution of pericarditis.\par - c/w HCQ 200mg PO BID. HCQ dosage is <5mg/kg/day or <400mg/day to minimize risk of retinal toxicity.  \par Side effects of HCQ were discussed with the patient including but not limited to GI upset, retinal toxicity, QT prolongation, cytopenias, myopathy. Discussed the importance of yearly ophthalmology evaluation.  \par - Discussed that potential additional medications based on any organ involvement or refractory symptoms include AZA, MTX, Benlysta. 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  \par - c/w gabapentin, tizanidine PRN\par - APLS management per other specialists with coumadin, agree with current management.  \par - RTC 3 months for follow up. \par

## 2023-02-02 ENCOUNTER — NON-APPOINTMENT (OUTPATIENT)
Age: 58
End: 2023-02-02

## 2023-02-02 ENCOUNTER — APPOINTMENT (OUTPATIENT)
Dept: OPHTHALMOLOGY | Facility: CLINIC | Age: 58
End: 2023-02-02
Payer: COMMERCIAL

## 2023-02-02 PROCEDURE — 99203 OFFICE O/P NEW LOW 30 MIN: CPT

## 2023-02-09 ENCOUNTER — NON-APPOINTMENT (OUTPATIENT)
Age: 58
End: 2023-02-09

## 2023-02-09 LAB — INR PPP: NORMAL

## 2023-02-13 ENCOUNTER — APPOINTMENT (OUTPATIENT)
Dept: OPHTHALMOLOGY | Facility: CLINIC | Age: 58
End: 2023-02-13
Payer: COMMERCIAL

## 2023-02-13 ENCOUNTER — NON-APPOINTMENT (OUTPATIENT)
Age: 58
End: 2023-02-13

## 2023-02-13 PROCEDURE — 92014 COMPRE OPH EXAM EST PT 1/>: CPT

## 2023-02-22 ENCOUNTER — LABORATORY RESULT (OUTPATIENT)
Age: 58
End: 2023-02-22

## 2023-03-07 ENCOUNTER — APPOINTMENT (OUTPATIENT)
Dept: CARDIOLOGY | Facility: CLINIC | Age: 58
End: 2023-03-07
Payer: COMMERCIAL

## 2023-03-07 PROCEDURE — 93306 TTE W/DOPPLER COMPLETE: CPT

## 2023-03-27 ENCOUNTER — LABORATORY RESULT (OUTPATIENT)
Age: 58
End: 2023-03-27

## 2023-03-28 ENCOUNTER — RX RENEWAL (OUTPATIENT)
Age: 58
End: 2023-03-28

## 2023-03-28 LAB — INR PPP: 2.38

## 2023-04-05 ENCOUNTER — RX RENEWAL (OUTPATIENT)
Age: 58
End: 2023-04-05

## 2023-04-06 ENCOUNTER — APPOINTMENT (OUTPATIENT)
Dept: CARDIOLOGY | Facility: CLINIC | Age: 58
End: 2023-04-06

## 2023-04-24 ENCOUNTER — NON-APPOINTMENT (OUTPATIENT)
Age: 58
End: 2023-04-24

## 2023-04-24 ENCOUNTER — APPOINTMENT (OUTPATIENT)
Dept: CARDIOLOGY | Facility: CLINIC | Age: 58
End: 2023-04-24
Payer: COMMERCIAL

## 2023-04-24 VITALS
HEART RATE: 66 BPM | SYSTOLIC BLOOD PRESSURE: 136 MMHG | DIASTOLIC BLOOD PRESSURE: 80 MMHG | WEIGHT: 195 LBS | HEIGHT: 67 IN | BODY MASS INDEX: 30.61 KG/M2 | OXYGEN SATURATION: 98 %

## 2023-04-24 PROCEDURE — 93000 ELECTROCARDIOGRAM COMPLETE: CPT

## 2023-04-24 PROCEDURE — 99214 OFFICE O/P EST MOD 30 MIN: CPT

## 2023-04-24 RX ORDER — AMITRIPTYLINE HYDROCHLORIDE 10 MG/1
10 TABLET, FILM COATED ORAL
Refills: 0 | Status: ACTIVE | COMMUNITY
Start: 1900-01-01 | End: 1900-01-01

## 2023-04-24 RX ORDER — METHYLPREDNISOLONE 4 MG/1
4 TABLET ORAL
Qty: 1 | Refills: 0 | Status: DISCONTINUED | COMMUNITY
Start: 2023-01-31 | End: 2023-04-24

## 2023-04-24 RX ORDER — WARFARIN 5 MG/1
5 TABLET ORAL
Qty: 135 | Refills: 1 | Status: DISCONTINUED | COMMUNITY
Start: 2020-01-10 | End: 2023-04-24

## 2023-04-24 RX ORDER — COLCHICINE 0.6 MG/1
0.6 CAPSULE ORAL
Qty: 180 | Refills: 1 | Status: DISCONTINUED | COMMUNITY
Start: 2021-11-08 | End: 2023-04-24

## 2023-04-24 RX ORDER — CYCLOSPORINE 0.5 MG/ML
0.05 EMULSION OPHTHALMIC
Refills: 0 | Status: ACTIVE | COMMUNITY

## 2023-04-28 ENCOUNTER — TRANSCRIPTION ENCOUNTER (OUTPATIENT)
Age: 58
End: 2023-04-28

## 2023-05-08 LAB — INR PPP: 2.05

## 2023-05-10 ENCOUNTER — APPOINTMENT (OUTPATIENT)
Dept: ULTRASOUND IMAGING | Facility: CLINIC | Age: 58
End: 2023-05-10
Payer: COMMERCIAL

## 2023-05-10 PROCEDURE — 76830 TRANSVAGINAL US NON-OB: CPT

## 2023-05-19 ENCOUNTER — APPOINTMENT (OUTPATIENT)
Dept: ORTHOPEDIC SURGERY | Facility: CLINIC | Age: 58
End: 2023-05-19
Payer: OTHER MISCELLANEOUS

## 2023-05-19 VITALS — BODY MASS INDEX: 30.61 KG/M2 | WEIGHT: 195 LBS | HEIGHT: 67 IN

## 2023-05-19 PROCEDURE — 26600 TREAT METACARPAL FRACTURE: CPT

## 2023-05-19 PROCEDURE — 99204 OFFICE O/P NEW MOD 45 MIN: CPT | Mod: 57

## 2023-05-19 NOTE — HISTORY OF PRESENT ILLNESS
[de-identified] : 58F, RHD, PMHx presents with right hand injury which was sustained at work on 5/16/23 at [employer]. 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 Admits to pain some swelling.

## 2023-05-19 NOTE — ASSESSMENT
[FreeTextEntry1] : Right 3rd metacarpal fracture, non displaced - will manage with closed management [CPT 76932]\par \par Reviewed radiographs with patient. Discussed radiograph alignment is within acceptable parameters for closed management. NWB, Elevate. Discussed risk of pain, stiffness and displacement requiring further intervention. Also discussed risk of post-traumatic arthritis. \par \par Will remain light duty; good prognosis.\par \par F/u 3weeks; repeat films; advance ROM

## 2023-05-19 NOTE — IMAGING
[de-identified] : Right hand with dorsal swelling. Skin intact. +ttp at 3rd metacarpal, -ecchymosis. Able to make gentle fist with no rotational deformity. Sensation intact throughout. <2sec cap refill.\par \par Right hand radiographs with 3rd metacarpal head fracture, nondisplaced.

## 2023-05-20 ENCOUNTER — APPOINTMENT (OUTPATIENT)
Dept: MRI IMAGING | Facility: CLINIC | Age: 58
End: 2023-05-20
Payer: COMMERCIAL

## 2023-05-20 PROCEDURE — 71550 MRI CHEST W/O DYE: CPT

## 2023-05-22 ENCOUNTER — APPOINTMENT (OUTPATIENT)
Dept: RADIOLOGY | Facility: CLINIC | Age: 58
End: 2023-05-22
Payer: COMMERCIAL

## 2023-05-22 ENCOUNTER — FORM ENCOUNTER (OUTPATIENT)
Age: 58
End: 2023-05-22

## 2023-05-22 PROCEDURE — 77085 DXA BONE DENSITY AXL VRT FX: CPT

## 2023-06-01 ENCOUNTER — LABORATORY RESULT (OUTPATIENT)
Age: 58
End: 2023-06-01

## 2023-06-09 ENCOUNTER — APPOINTMENT (OUTPATIENT)
Dept: ORTHOPEDIC SURGERY | Facility: CLINIC | Age: 58
End: 2023-06-09
Payer: OTHER MISCELLANEOUS

## 2023-06-09 VITALS — HEIGHT: 67 IN | WEIGHT: 195 LBS | BODY MASS INDEX: 30.61 KG/M2

## 2023-06-09 DIAGNOSIS — S62.309A UNSPECIFIED FRACTURE OF UNSPECIFIED METACARPAL BONE, INITIAL ENCOUNTER FOR CLOSED FRACTURE: ICD-10-CM

## 2023-06-09 PROCEDURE — 73130 X-RAY EXAM OF HAND: CPT | Mod: RT

## 2023-06-09 PROCEDURE — 99024 POSTOP FOLLOW-UP VISIT: CPT

## 2023-06-09 NOTE — ASSESSMENT
[FreeTextEntry1] : Right 3rd metacarpal fracture, non displaced - will manage with closed management [CPT 05079]\par \par Reviewed radiographs with patient. Discussed radiograph alignment is within acceptable parameters for closed management. NWB, Elevate. Discussed risk of pain, stiffness and displacement requiring further intervention. Also discussed risk of post-traumatic arthritis. \par \par Will remain light duty; good prognosis.\par \par F/u 6weeks; repeat films; advance ROM

## 2023-06-09 NOTE — HISTORY OF PRESENT ILLNESS
[de-identified] : 58F, RHD, PMHx of HTN, Arthritis and Hypothyroidism presents with right hand injury which was sustained at work on 5/16/23 at [employer]. 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 Admits to pain some swelling. \par \par 06/09/23: f/u right hand. Patient reports residual pain and swelling in her right hand and into her right middle finger. Patient unable to make a fist secondary to pain. Denies numbness/tingling.  \par

## 2023-06-09 NOTE — IMAGING
[de-identified] : Right hand with resolving swelling. Skin intact. +ttp at 3rd metacarpal, -ecchymosis. Able to make fist with no rotational deformity. Sensation intact throughout. <2sec cap refill.\par \par Right hand radiographs with 3rd metacarpal head fracture, nondisplaced. Alignment maintained.

## 2023-06-19 ENCOUNTER — APPOINTMENT (OUTPATIENT)
Dept: RHEUMATOLOGY | Facility: CLINIC | Age: 58
End: 2023-06-19

## 2023-06-30 ENCOUNTER — APPOINTMENT (OUTPATIENT)
Dept: ORTHOPEDIC SURGERY | Facility: CLINIC | Age: 58
End: 2023-06-30
Payer: OTHER MISCELLANEOUS

## 2023-06-30 VITALS — WEIGHT: 195 LBS | HEIGHT: 67 IN | BODY MASS INDEX: 30.61 KG/M2

## 2023-06-30 PROCEDURE — 99024 POSTOP FOLLOW-UP VISIT: CPT

## 2023-06-30 PROCEDURE — 73130 X-RAY EXAM OF HAND: CPT | Mod: RT

## 2023-06-30 NOTE — HISTORY OF PRESENT ILLNESS
[de-identified] : 58F, RHD, PMHx of Lupus, HTN, Arthritis and Hypothyroidism presents with right hand injury which was sustained at work on 5/16/23 at [employer]. 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 Admits to pain some swelling. \par \par 06/09/23: f/u right hand. Patient reports residual pain and swelling in her right hand and into her right middle finger. Patient unable to make a fist secondary to pain. Denies numbness/tingling.  \par \par 06/30/23: f/u right hand. Patient has been doing OT 2x a week, and is having pain and bruising over her right hand. She states that the pain is aggravated by the OT, and is reporting intermittent numbness in her fingers. Patient was referred by the OT to come in to get evaluated.

## 2023-06-30 NOTE — IMAGING
[de-identified] : Right hand with resolving swelling. Skin intact. +ttp at 3rd metacarpal, -ecchymosis. Able to make fist with no rotational deformity. Sensation intact throughout. <2sec cap refill.\par \par Right hand radiographs with 3rd metacarpal head fracture, nondisplaced. Healed.

## 2023-06-30 NOTE — ASSESSMENT
[FreeTextEntry1] : Right 3rd metacarpal fracture, non displaced - will manage with closed management [CPT 10981]\par \par Reviewed radiographs with patient. Discussed radiograph alignment is within acceptable parameters for closed management. WBAT Elevate. Discussed risk of pain, stiffness and displacement requiring further intervention. Also discussed risk of post-traumatic arthritis. OT for ROM and strengthening.\par \par Will retjrn full duty; good prognosis.\par \par F/u 6weeks; repeat films

## 2023-07-10 ENCOUNTER — FORM ENCOUNTER (OUTPATIENT)
Age: 58
End: 2023-07-10

## 2023-07-13 LAB
INR PPP: 2.39 RATIO
PT BLD: 28.4 SEC

## 2023-07-17 ENCOUNTER — FORM ENCOUNTER (OUTPATIENT)
Age: 58
End: 2023-07-17

## 2023-07-19 ENCOUNTER — APPOINTMENT (OUTPATIENT)
Dept: ORTHOPEDIC SURGERY | Facility: CLINIC | Age: 58
End: 2023-07-19
Payer: COMMERCIAL

## 2023-07-19 DIAGNOSIS — M77.41 METATARSALGIA, RIGHT FOOT: ICD-10-CM

## 2023-07-19 PROCEDURE — 20551 NJX 1 TENDON ORIGIN/INSJ: CPT | Mod: LT

## 2023-07-19 PROCEDURE — 99213 OFFICE O/P EST LOW 20 MIN: CPT | Mod: 25

## 2023-07-19 PROCEDURE — 20550 NJX 1 TENDON SHEATH/LIGAMENT: CPT | Mod: RT

## 2023-07-19 NOTE — HISTORY OF PRESENT ILLNESS
[de-identified] : Patient presents today for evaluation of LT foot pain. Patient states she has plantar fasciitis and is in pain.

## 2023-07-19 NOTE — PHYSICAL EXAM
[Right] : right foot and ankle [Mild] : mild swelling of MTP joint/great toe [4th] : 4th [NL 30)] : inversion 30 degrees [NL (40)] : MTP joint DF 40 degrees [NL (20)] : MTP joint PF 20 degrees [5___] : [facility] 5[unfilled]/5 [2+] : posterior tibialis pulse: 2+ [Normal] : saphenous nerve sensation normal [] : varicosities are not warm and well perfused

## 2023-07-19 NOTE — ASSESSMENT
[FreeTextEntry1] : DEBRIDEMENT OF LESION 4TH MPJ RIGHT FOOT\par CELESTONE 6 MG APPLIED WITH LIDOCAINE PLAIN 10 MG  TO REDUCE PAIN AND SWELLING\par CELESTONE 2 UNITS, LIDOCAINE PLAIN 2 UNITS LEFT FOOT \par NSAID OF CHOICE FOR PAIN.\par TYLENOL FOR PAIN\par COLD COMPRESSES.\par \par

## 2023-08-11 ENCOUNTER — APPOINTMENT (OUTPATIENT)
Dept: ORTHOPEDIC SURGERY | Facility: CLINIC | Age: 58
End: 2023-08-11
Payer: OTHER MISCELLANEOUS

## 2023-08-11 VITALS — BODY MASS INDEX: 30.61 KG/M2 | HEIGHT: 67 IN | WEIGHT: 195 LBS

## 2023-08-11 DIAGNOSIS — M79.643 PAIN IN UNSPECIFIED HAND: ICD-10-CM

## 2023-08-11 PROCEDURE — 99024 POSTOP FOLLOW-UP VISIT: CPT

## 2023-08-11 PROCEDURE — 73130 X-RAY EXAM OF HAND: CPT | Mod: RT

## 2023-08-11 NOTE — ASSESSMENT
[FreeTextEntry1] : Right 3rd metacarpal fracture, non displaced - will continue to manage with closed management  Reviewed radiographs with patient. Discussed radiograph alignment is within acceptable parameters for closed management. WBAT Elevate. Discussed risk of pain, stiffness and displacement requiring further intervention. Also discussed risk of post-traumatic arthritis. OT for ROM and strengthening.  Will return full duty; good prognosis.  F/u prn

## 2023-08-11 NOTE — IMAGING
[de-identified] : Right hand with resolving swelling. Skin intact. Mild ttp at 3rd metacarpal, -ecchymosis. Able to make fist with no rotational deformity. Sensation intact throughout. <2sec cap refill.  Right hand radiographs with 3rd metacarpal head fracture, nondisplaced. Healed.

## 2023-08-11 NOTE — HISTORY OF PRESENT ILLNESS
[de-identified] : 58F, RHD, PMHx of Lupus, HTN, Arthritis and Hypothyroidism presents with right hand injury which was sustained at work on 5/16/23 at [employer]. 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 Admits to pain some swelling.   06/09/23: f/u right hand. Patient reports residual pain and swelling in her right hand and into her right middle finger. Patient unable to make a fist secondary to pain. Denies numbness/tingling.    06/30/23: f/u right hand. Patient has been doing OT 2x a week, and is having pain and bruising over her right hand. She states that the pain is aggravated by the OT, and is reporting intermittent numbness in her fingers. Patient was referred by the OT to come in to get evaluated.   08/11/23: f/u right hand. Patient is at work full time and states that she has to compensate how she does her job due to her symptoms. Patient has been doing OT 2x a week with relief. 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

## 2023-08-13 ENCOUNTER — APPOINTMENT (OUTPATIENT)
Dept: OPHTHALMOLOGY | Facility: CLINIC | Age: 58
End: 2023-08-13
Payer: COMMERCIAL

## 2023-08-13 ENCOUNTER — NON-APPOINTMENT (OUTPATIENT)
Age: 58
End: 2023-08-13

## 2023-08-13 PROCEDURE — 92201 OPSCPY EXTND RTA DRAW UNI/BI: CPT

## 2023-08-13 PROCEDURE — 99215 OFFICE O/P EST HI 40 MIN: CPT

## 2023-08-14 ENCOUNTER — APPOINTMENT (OUTPATIENT)
Dept: OPHTHALMOLOGY | Facility: CLINIC | Age: 58
End: 2023-08-14

## 2023-09-01 ENCOUNTER — LABORATORY RESULT (OUTPATIENT)
Age: 58
End: 2023-09-01

## 2023-09-25 ENCOUNTER — APPOINTMENT (OUTPATIENT)
Dept: CT IMAGING | Facility: CLINIC | Age: 58
End: 2023-09-25
Payer: COMMERCIAL

## 2023-09-25 PROCEDURE — 74176 CT ABD & PELVIS W/O CONTRAST: CPT

## 2023-10-02 ENCOUNTER — LABORATORY RESULT (OUTPATIENT)
Age: 58
End: 2023-10-02

## 2023-10-02 ENCOUNTER — APPOINTMENT (OUTPATIENT)
Dept: ULTRASOUND IMAGING | Facility: CLINIC | Age: 58
End: 2023-10-02

## 2023-10-05 ENCOUNTER — NON-APPOINTMENT (OUTPATIENT)
Age: 58
End: 2023-10-05

## 2023-10-09 ENCOUNTER — NON-APPOINTMENT (OUTPATIENT)
Age: 58
End: 2023-10-09

## 2023-10-17 ENCOUNTER — APPOINTMENT (OUTPATIENT)
Dept: ULTRASOUND IMAGING | Facility: CLINIC | Age: 58
End: 2023-10-17

## 2023-10-17 ENCOUNTER — APPOINTMENT (OUTPATIENT)
Dept: MAMMOGRAPHY | Facility: CLINIC | Age: 58
End: 2023-10-17
Payer: COMMERCIAL

## 2023-10-17 PROCEDURE — 77067 SCR MAMMO BI INCL CAD: CPT

## 2023-10-17 PROCEDURE — 76642 ULTRASOUND BREAST LIMITED: CPT | Mod: LT

## 2023-10-17 PROCEDURE — 77063 BREAST TOMOSYNTHESIS BI: CPT

## 2023-10-26 ENCOUNTER — APPOINTMENT (OUTPATIENT)
Dept: RHEUMATOLOGY | Facility: CLINIC | Age: 58
End: 2023-10-26
Payer: COMMERCIAL

## 2023-10-26 ENCOUNTER — LABORATORY RESULT (OUTPATIENT)
Age: 58
End: 2023-10-26

## 2023-10-26 ENCOUNTER — APPOINTMENT (OUTPATIENT)
Dept: ORTHOPEDIC SURGERY | Facility: CLINIC | Age: 58
End: 2023-10-26
Payer: COMMERCIAL

## 2023-10-26 ENCOUNTER — APPOINTMENT (OUTPATIENT)
Dept: UROGYNECOLOGY | Facility: CLINIC | Age: 58
End: 2023-10-26
Payer: COMMERCIAL

## 2023-10-26 VITALS
DIASTOLIC BLOOD PRESSURE: 88 MMHG | HEART RATE: 69 BPM | SYSTOLIC BLOOD PRESSURE: 148 MMHG | TEMPERATURE: 97.8 F | BODY MASS INDEX: 29.82 KG/M2 | HEIGHT: 67 IN | OXYGEN SATURATION: 100 % | WEIGHT: 190 LBS

## 2023-10-26 DIAGNOSIS — N39.3 STRESS INCONTINENCE (FEMALE) (MALE): ICD-10-CM

## 2023-10-26 DIAGNOSIS — N39.46 MIXED INCONTINENCE: ICD-10-CM

## 2023-10-26 DIAGNOSIS — N95.2 POSTMENOPAUSAL ATROPHIC VAGINITIS: ICD-10-CM

## 2023-10-26 DIAGNOSIS — M72.2 PLANTAR FASCIAL FIBROMATOSIS: ICD-10-CM

## 2023-10-26 DIAGNOSIS — L93.0 DISCOID LUPUS ERYTHEMATOSUS: ICD-10-CM

## 2023-10-26 DIAGNOSIS — R32 UNSPECIFIED URINARY INCONTINENCE: ICD-10-CM

## 2023-10-26 DIAGNOSIS — Z87.39 PERSONAL HISTORY OF OTHER DISEASES OF THE MUSCULOSKELETAL SYSTEM AND CONNECTIVE TISSUE: ICD-10-CM

## 2023-10-26 PROCEDURE — 36415 COLL VENOUS BLD VENIPUNCTURE: CPT

## 2023-10-26 PROCEDURE — L4361: CPT | Mod: LT

## 2023-10-26 PROCEDURE — 99215 OFFICE O/P EST HI 40 MIN: CPT | Mod: 25

## 2023-10-26 PROCEDURE — 73630 X-RAY EXAM OF FOOT: CPT | Mod: LT

## 2023-10-26 PROCEDURE — 99204 OFFICE O/P NEW MOD 45 MIN: CPT | Mod: 25

## 2023-10-26 PROCEDURE — 51701 INSERT BLADDER CATHETER: CPT | Mod: 59

## 2023-10-26 PROCEDURE — 99213 OFFICE O/P EST LOW 20 MIN: CPT

## 2023-10-27 LAB
ALBUMIN SERPL ELPH-MCNC: 4.7 G/DL
ALP BLD-CCNC: 92 U/L
ALT SERPL-CCNC: 16 U/L
ANION GAP SERPL CALC-SCNC: 10 MMOL/L
APPEARANCE: CLEAR
APPEARANCE: CLEAR
APTT BLD: 71.8 SEC
AST SERPL-CCNC: 22 U/L
BACTERIA: NEGATIVE /HPF
BILIRUB SERPL-MCNC: 0.3 MG/DL
BILIRUBIN URINE: NEGATIVE
BILIRUBIN URINE: NEGATIVE
BLOOD URINE: NEGATIVE
BLOOD URINE: NEGATIVE
BUN SERPL-MCNC: 11 MG/DL
C3 SERPL-MCNC: 119 MG/DL
C4 SERPL-MCNC: 23 MG/DL
CALCIUM SERPL-MCNC: 9.8 MG/DL
CAST: 0 /LPF
CHLORIDE SERPL-SCNC: 104 MMOL/L
CO2 SERPL-SCNC: 29 MMOL/L
COLOR: YELLOW
COLOR: YELLOW
CREAT SERPL-MCNC: 0.77 MG/DL
CREAT SPEC-SCNC: 70 MG/DL
CREAT/PROT UR: 0.1 RATIO
CRP SERPL-MCNC: <3 MG/L
EGFR: 88 ML/MIN/1.73M2
EPITHELIAL CELLS: 0 /HPF
ERYTHROCYTE [SEDIMENTATION RATE] IN BLOOD BY WESTERGREN METHOD: 27 MM/HR
GLUCOSE QUALITATIVE U: 100 MG/DL
GLUCOSE QUALITATIVE U: 100 MG/DL
GLUCOSE SERPL-MCNC: 83 MG/DL
HCT VFR BLD CALC: 46.8 %
HGB BLD-MCNC: 15.4 G/DL
KETONES URINE: ABNORMAL MG/DL
KETONES URINE: NEGATIVE MG/DL
LEUKOCYTE ESTERASE URINE: NEGATIVE
LEUKOCYTE ESTERASE URINE: NEGATIVE
MCHC RBC-ENTMCNC: 31.4 PG
MCHC RBC-ENTMCNC: 32.9 GM/DL
MCV RBC AUTO: 95.5 FL
MICROSCOPIC-UA: NORMAL
NITRITE URINE: NEGATIVE
NITRITE URINE: NEGATIVE
PH URINE: 6.5
PH URINE: 7
PLATELET # BLD AUTO: 252 K/UL
POTASSIUM SERPL-SCNC: 4.3 MMOL/L
PROT SERPL-MCNC: 7.2 G/DL
PROT UR-MCNC: 6 MG/DL
PROTEIN URINE: 30 MG/DL
PROTEIN URINE: NORMAL MG/DL
RBC # BLD: 4.9 M/UL
RBC # FLD: 13.4 %
RED BLOOD CELLS URINE: 1 /HPF
REVIEW: NORMAL
SODIUM SERPL-SCNC: 143 MMOL/L
SPECIFIC GRAVITY URINE: 1.01
SPECIFIC GRAVITY URINE: 1.02
TSH SERPL-ACNC: 0.67 UIU/ML
UROBILINOGEN URINE: 0.2 MG/DL
UROBILINOGEN URINE: 1 MG/DL
WBC # FLD AUTO: 4.99 K/UL
WHITE BLOOD CELLS URINE: 0 /HPF

## 2023-10-29 LAB — DSDNA AB SER-ACNC: <12 IU/ML

## 2023-10-30 LAB — BACTERIA UR CULT: NORMAL

## 2023-11-02 LAB
INR PPP: 2.25 RATIO
PT BLD: 24.8 SEC

## 2023-11-05 LAB — 14-3-3 ETA AG SER IA-MCNC: <0.2 NG/ML

## 2023-11-07 ENCOUNTER — APPOINTMENT (OUTPATIENT)
Dept: ULTRASOUND IMAGING | Facility: CLINIC | Age: 58
End: 2023-11-07
Payer: COMMERCIAL

## 2023-11-07 PROCEDURE — 93971 EXTREMITY STUDY: CPT | Mod: LT

## 2023-11-13 ENCOUNTER — NON-APPOINTMENT (OUTPATIENT)
Age: 58
End: 2023-11-13

## 2023-11-13 ENCOUNTER — TRANSCRIPTION ENCOUNTER (OUTPATIENT)
Age: 58
End: 2023-11-13

## 2023-11-13 ENCOUNTER — APPOINTMENT (OUTPATIENT)
Dept: OPHTHALMOLOGY | Facility: CLINIC | Age: 58
End: 2023-11-13
Payer: COMMERCIAL

## 2023-11-13 PROCEDURE — 92014 COMPRE OPH EXAM EST PT 1/>: CPT

## 2023-11-16 ENCOUNTER — APPOINTMENT (OUTPATIENT)
Dept: ULTRASOUND IMAGING | Facility: CLINIC | Age: 58
End: 2023-11-16
Payer: COMMERCIAL

## 2023-11-16 PROCEDURE — 76705 ECHO EXAM OF ABDOMEN: CPT

## 2023-11-22 ENCOUNTER — NON-APPOINTMENT (OUTPATIENT)
Age: 58
End: 2023-11-22

## 2023-11-22 ENCOUNTER — LABORATORY RESULT (OUTPATIENT)
Age: 58
End: 2023-11-22

## 2023-11-25 ENCOUNTER — RESULT REVIEW (OUTPATIENT)
Age: 58
End: 2023-11-25

## 2023-11-25 ENCOUNTER — APPOINTMENT (OUTPATIENT)
Dept: MRI IMAGING | Facility: CLINIC | Age: 58
End: 2023-11-25
Payer: COMMERCIAL

## 2023-11-25 PROCEDURE — 73721 MRI JNT OF LWR EXTRE W/O DYE: CPT | Mod: LT

## 2023-11-27 ENCOUNTER — APPOINTMENT (OUTPATIENT)
Dept: CARDIOLOGY | Facility: CLINIC | Age: 58
End: 2023-11-27
Payer: COMMERCIAL

## 2023-11-27 ENCOUNTER — NON-APPOINTMENT (OUTPATIENT)
Age: 58
End: 2023-11-27

## 2023-11-27 VITALS
WEIGHT: 194 LBS | SYSTOLIC BLOOD PRESSURE: 130 MMHG | OXYGEN SATURATION: 98 % | DIASTOLIC BLOOD PRESSURE: 82 MMHG | HEIGHT: 67 IN | BODY MASS INDEX: 30.45 KG/M2 | HEART RATE: 92 BPM

## 2023-11-27 DIAGNOSIS — Z79.01 LONG TERM (CURRENT) USE OF ANTICOAGULANTS: ICD-10-CM

## 2023-11-27 PROCEDURE — 99214 OFFICE O/P EST MOD 30 MIN: CPT

## 2023-11-27 PROCEDURE — 93000 ELECTROCARDIOGRAM COMPLETE: CPT

## 2023-12-07 ENCOUNTER — APPOINTMENT (OUTPATIENT)
Dept: ORTHOPEDIC SURGERY | Facility: CLINIC | Age: 58
End: 2023-12-07

## 2023-12-07 ENCOUNTER — RESULT CHARGE (OUTPATIENT)
Age: 58
End: 2023-12-07

## 2023-12-07 ENCOUNTER — APPOINTMENT (OUTPATIENT)
Dept: ORTHOPEDIC SURGERY | Facility: CLINIC | Age: 58
End: 2023-12-07
Payer: COMMERCIAL

## 2023-12-07 DIAGNOSIS — M70.62 TROCHANTERIC BURSITIS, LEFT HIP: ICD-10-CM

## 2023-12-07 PROCEDURE — 99203 OFFICE O/P NEW LOW 30 MIN: CPT

## 2023-12-07 PROCEDURE — 73502 X-RAY EXAM HIP UNI 2-3 VIEWS: CPT

## 2023-12-07 RX ORDER — TIZANIDINE 4 MG/1
4 TABLET ORAL 3 TIMES DAILY
Qty: 270 | Refills: 3 | Status: ACTIVE | COMMUNITY
Start: 1900-01-01 | End: 1900-01-01

## 2023-12-14 ENCOUNTER — APPOINTMENT (OUTPATIENT)
Dept: UROGYNECOLOGY | Facility: CLINIC | Age: 58
End: 2023-12-14

## 2023-12-20 ENCOUNTER — APPOINTMENT (OUTPATIENT)
Dept: ORTHOPEDIC SURGERY | Facility: CLINIC | Age: 58
End: 2023-12-20
Payer: COMMERCIAL

## 2023-12-20 ENCOUNTER — LABORATORY RESULT (OUTPATIENT)
Age: 58
End: 2023-12-20

## 2023-12-20 ENCOUNTER — RESULT REVIEW (OUTPATIENT)
Age: 58
End: 2023-12-20

## 2023-12-20 ENCOUNTER — APPOINTMENT (OUTPATIENT)
Dept: MRI IMAGING | Facility: CLINIC | Age: 58
End: 2023-12-20
Payer: COMMERCIAL

## 2023-12-20 DIAGNOSIS — M72.2 PLANTAR FASCIAL FIBROMATOSIS: ICD-10-CM

## 2023-12-20 DIAGNOSIS — M84.376A STRESS FRACTURE, UNSPECIFIED FOOT, INITIAL ENCOUNTER FOR FRACTURE: ICD-10-CM

## 2023-12-20 DIAGNOSIS — M79.672 PAIN IN LEFT FOOT: ICD-10-CM

## 2023-12-20 PROCEDURE — 20550 NJX 1 TENDON SHEATH/LIGAMENT: CPT | Mod: LT

## 2023-12-20 PROCEDURE — 73721 MRI JNT OF LWR EXTRE W/O DYE: CPT | Mod: LT

## 2023-12-20 PROCEDURE — 99214 OFFICE O/P EST MOD 30 MIN: CPT | Mod: 25

## 2023-12-20 NOTE — ASSESSMENT
[FreeTextEntry1] : CELESTONE 6 MG APPLIED WITH LIDOCAINE PLAIN 10 MG TO REDUCE PAIN AND SWELLING CELESTONE 2 UNITS, LIDOCAINE PLAIN 2 UNITS LEFT PLANTAR FASCIA  NSAID OF CHOICE FOR PAIN. TYLENOL FOR PAIN COLD COMPRESSES.  I WILL REPEAT THE STEROID UP TO TWO TIMES IF NO CHANGE IN PAIN LEVEL. MRI MAY BE NEEDED FOR FURTHER EVALUATION PT MAY BE NEEDED IF NO CHANGE IN PAIN LEVELS. THE GOAL OF THE STEROID IS TO REGAIN MOBILITY BY REDUCING PAIN. PATIENT ADVISED THAT THE INJECTION MAY CAUSE MORE PAIN FOR SEVERAL DAYS SO THE USE OF WARM OR COLD COMPRESSES IS ADVISED AS WELL AR AN NSAID OF CHOCE OR TYLENOL PATIENT CAN AMBULATE AS TOLERATED AND EXERCISE AS TOLERATED. THE RISKS OF THE STEROID WERE DISCUSSED TODAY. PT MAY BE NEEDED PENDING OUTCOME OF PAIN REDUCTION. ORTHOTICS WERE DISCUSSED OR USE OF OTC INSERTS CALL OFFICE IF ANY ISSUES DEVELOP SUCH AS REDNESS, EDEMA, TEMPERATURE ELEVATION OR SEVERE PAIN. RTO PRN

## 2023-12-20 NOTE — DATA REVIEWED
[MRI] : MRI [Report was reviewed and noted in the chart] : The report was reviewed and noted in the chart [I reviewed the films/CD and agree] : I reviewed the films/CD and agree [FreeTextEntry1] : low grade tear plantar fascia and stress reaction of 2nd met and navicular

## 2023-12-20 NOTE — PHYSICAL EXAM
[Right] : right foot and ankle [Mild] : mild swelling of MTP joint/great toe [4th] : 4th [NL 30)] : inversion 30 degrees [NL (40)] : MTP joint DF 40 degrees [NL (20)] : MTP joint PF 20 degrees [5___] : [facility] 5[unfilled]/5 [2+] : posterior tibialis pulse: 2+ [Normal] : saphenous nerve sensation normal [] : varicosities are not warm and well perfused

## 2024-01-06 ENCOUNTER — LABORATORY RESULT (OUTPATIENT)
Age: 59
End: 2024-01-06

## 2024-01-08 ENCOUNTER — TRANSCRIPTION ENCOUNTER (OUTPATIENT)
Age: 59
End: 2024-01-08

## 2024-01-11 ENCOUNTER — APPOINTMENT (OUTPATIENT)
Dept: ENDOCRINOLOGY | Facility: CLINIC | Age: 59
End: 2024-01-11

## 2024-01-11 ENCOUNTER — LABORATORY RESULT (OUTPATIENT)
Age: 59
End: 2024-01-11

## 2024-01-20 ENCOUNTER — NON-APPOINTMENT (OUTPATIENT)
Age: 59
End: 2024-01-20

## 2024-01-24 ENCOUNTER — APPOINTMENT (OUTPATIENT)
Dept: RHEUMATOLOGY | Facility: CLINIC | Age: 59
End: 2024-01-24
Payer: COMMERCIAL

## 2024-01-24 VITALS
BODY MASS INDEX: 30.7 KG/M2 | DIASTOLIC BLOOD PRESSURE: 90 MMHG | WEIGHT: 196 LBS | OXYGEN SATURATION: 99 % | HEART RATE: 83 BPM | SYSTOLIC BLOOD PRESSURE: 150 MMHG | TEMPERATURE: 97.6 F

## 2024-01-24 PROCEDURE — 99214 OFFICE O/P EST MOD 30 MIN: CPT

## 2024-01-24 PROCEDURE — G2211 COMPLEX E/M VISIT ADD ON: CPT

## 2024-01-24 NOTE — PHYSICAL EXAM
[TextEntry] : GENERAL: Appears in no acute distress HEENT: EOMI, PERRLA. No conjunctival erythema. Moist mucous membranes. No nasopharyngeal ulcers NECK: Supple, no cervical lymphadenopathy, no thyromegaly CARDIOVASCULAR: RRR. S1, S2 auscultated. No murmurs or rubs. PULMONARY: Clear to auscultation b/l, no wheezes, rales, or crackles ABDOMINAL: Soft, nontender, nondistended. Bowel sounds present. No organomegaly. MSK: No active synovitis, swelling, erythema, or warmth. No deformities. Normal ROM of neck, back, b/l upper and lower extremities. SKIN: Mild erythema of b/l hands. Cold fingertips to touch. Patchy hyperpigmented rash of b/l ankles and dorsal feet. NEURO: No focal deficits PSYCH: AAOx3. Normal affect and thought process.

## 2024-01-24 NOTE — HISTORY OF PRESENT ILLNESS
[FreeTextEntry1] : HISTORY:  58 y/o female w/ SLE, APLS, Hashimoto's, GERD, asthma presents as follow up for management of SLE and APLS. Pt had multiple TIAs diagnosed with APLS, now treated with Coumadin. Pt was diagnosed with SLE in age 33.  Pt reports joint pains, oral ulcers, rashes of hands, ankles, feet, dry eyes, dry mouth, dry skin, pericarditis, Raynaud's.  Pt has ligament tear of hips with R hip trochanteric bursitis and had steroid injection by osteopath in past. Pt has undergone PT.  Pt had COVID infection in 1/2022 with sciatica, fever, loss of taste/smell. Pt had J&J vaccine with recurrence of pericarditis x 6 months treated with colchicine and steroids. 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 Pt had peptic ulcer in past and is on anticoagulation would avoid NSAIDs.  Pt has had Hx of angioedema, multiple medication allergies, anaphylaxis  Patient is on HCQ 200mg PO BID for SLE (since age 33), Coumadin for APLS, and amitriptyline 20mg QHS for migraines.  Pt had cardiac cath 4/2021 which was non-obstructive.  Pt is changing provider for change in insurance.  Sister - ITP, Mother - Sweet syndrome   Pt has SLE by history based on positive RICKI, APLS, joint pains, oral ulcers, rash, sicca symptoms, pericarditis, Raynaud's. Workup by me with B2GP+, LA+, TPO+ and mild low Vit D. Otherwise normal/negative RICKI, RICKI subserologies, inflammatory markers, complements.   Pt has had pericarditis recurrence which was treated with colchicine. Pt's pericarditis is currently under control. 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   In 12/2022 and 1/2023, pt had COVID infection, reaction to flu vaccine, and allergic reaction to clindamycin which have all contributed to flare of her underlying autoimmune disease. Pt went to [facility] ED for various symptoms and TIA was ruled out. Pt received solumedrol and Benedryl with improvement but continues to have lingering symptoms.   INTERVAL HISTORY:  Pt has not been on AZA since last visit since pt has been dealing with multiple medical problems including infections, uncontrolled INR, facial trauma, etc. Pt's BP has been consistently high ~150s-200s/100s - has been discussing with cardiology. Pt is currently having sacral pain with sciatic pain down the b/l legs and GI symptoms - pt had similar symptoms with prior COVID infections but COVID tests have been negative. Pt had L hip MRI with significant tendinitis but without signs of AVN or any other major mechanical issues. Pt has been taking HCQ 200mg PO daily. Pt has been following regularly with ophthalmology.  WORKUP:  Remarkable for (6/2022): B2GP IgA >150, LA+, TPO Ab+, Vit D 25-OH 24  Normal/neg (6/2022): CBC, CMP, ESR, CRP, U/A, UPCR, RICKI, dsDNA, SSA, SSB, Stokes, RNP, histone Ab, ribosomal P Ab, C3, C4, TG Ab, B2GP IgG/IgM, Juanita-1, SCl-70, centromere Ab, ANCAs, Direct Kuldeep, CPK, aldolase, FLC ratio, thyroid panel, TSH, B12, Folate, iron panel, TPMT level, Hep B/C, Quantiferon TB

## 2024-01-24 NOTE — ASSESSMENT
[FreeTextEntry1] : 58 y/o female w/ SLE, APLS, Hashimoto's, GERD, asthma presents as follow up for management of SLE and APLS. Pt had multiple TIAs diagnosed with APLS, now treated with Coumadin. Pt was diagnosed with SLE in age 33.  Pt reports joint pains, oral ulcers, rashes of hands, ankles, feet, dry eyes, dry mouth, dry skin, pericarditis, Raynaud's.  Pt has ligament tear of hips with R hip trochanteric bursitis and had steroid injection by osteopath in past. Pt has undergone PT.  Pt had COVID infection in 1/2022 with sciatica, fever, loss of taste/smell. Pt had J&J vaccine with recurrence of pericarditis x 6 months treated with colchicine and steroids. 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 Pt had peptic ulcer in past and is on anticoagulation would avoid NSAIDs.  Pt has had Hx of angioedema, multiple medication allergies, anaphylaxis  Patient is on HCQ 200mg PO BID for SLE (since age 33), Coumadin for APLS, and amitriptyline 20mg QHS for migraines.  Pt had cardiac cath 4/2021 which was non-obstructive.  Pt is changing provider for change in insurance.  Sister - ITP, Mother - Sweet syndrome   Pt has SLE by history based on positive RICKI, APLS, joint pains, oral ulcers, rash, sicca symptoms, pericarditis, Raynaud's. Workup by me with B2GP+, LA+, TPO+ and mild low Vit D. Otherwise normal/negative RICKI, RICKI subserologies, inflammatory markers, complements.   Pt has had pericarditis recurrence which was treated with colchicine. Pt's pericarditis is currently under control. 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   Pt was planned to be started on AZA but delayed due to multiple medical problems including infections, uncontrolled INR, facial trauma, etc. Pt's BP has been consistently high ~150s-200s/100s.Pt is currently having sacral pain with sciatic pain down the b/l legs and GI symptoms - pt had similar symptoms with prior COVID infections but COVID tests have been negative. I would agree completely that pt should hold off  on starting a new medication until her long term medical conditions are under control.  - Obtain labwork for SLE activity  - Advised to hold off starting AZA until her acute and chronic medical conditions are under control. Then, start AZA 50mg/day until seen by me next. Side effects of AZA were discussed with the patient in detail including but not limited to GI upset, hepatotoxicity, and myelosuppression with cytopenias.  This is a high-risk therapy requiring intense monitoring for toxicity. Will evaluate with frequent monitoring of Cr, CBC, and LFTs.  - c/w HCQ 200mg PO daily (advised to decrease from BID by ophthalmologist). HCQ dosage is <5mg/kg/day or <400mg/day to minimize risk of retinal toxicity.  Side effects of HCQ were discussed with the patient including but not limited to GI upset, retinal toxicity, QT prolongation, cytopenias, myopathy. Discussed the importance of yearly ophthalmology evaluation.  - Discussed that potential additional medications based on any organ involvement or refractory symptoms include MTX, Benlysta. 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  - c/w gabapentin, tizanidine PRN  - APLS management per other specialists with coumadin, agree with current management.  - RTC 3 months for follow up.

## 2024-01-25 LAB
APPEARANCE: CLEAR
BILIRUBIN URINE: NEGATIVE
BLOOD URINE: NEGATIVE
COLOR: YELLOW
CREAT SPEC-SCNC: 66 MG/DL
CREAT/PROT UR: 0.1 RATIO
GLUCOSE QUALITATIVE U: NEGATIVE MG/DL
KETONES URINE: NEGATIVE MG/DL
LEUKOCYTE ESTERASE URINE: NEGATIVE
NITRITE URINE: NEGATIVE
PH URINE: 7
PROT UR-MCNC: 8 MG/DL
PROTEIN URINE: NEGATIVE MG/DL
SPECIFIC GRAVITY URINE: 1.01
UROBILINOGEN URINE: 0.2 MG/DL

## 2024-01-25 RX ORDER — LOSARTAN POTASSIUM 100 MG/1
100 TABLET, FILM COATED ORAL DAILY
Qty: 90 | Refills: 3 | Status: ACTIVE | COMMUNITY
Start: 1900-01-01 | End: 1900-01-01

## 2024-01-29 ENCOUNTER — APPOINTMENT (OUTPATIENT)
Dept: OBGYN | Facility: CLINIC | Age: 59
End: 2024-01-29
Payer: COMMERCIAL

## 2024-01-29 VITALS
DIASTOLIC BLOOD PRESSURE: 90 MMHG | BODY MASS INDEX: 30.76 KG/M2 | WEIGHT: 196 LBS | HEIGHT: 67 IN | SYSTOLIC BLOOD PRESSURE: 159 MMHG

## 2024-01-29 DIAGNOSIS — Z86.79 PERSONAL HISTORY OF OTHER DISEASES OF THE CIRCULATORY SYSTEM: ICD-10-CM

## 2024-01-29 DIAGNOSIS — Z01.419 ENCOUNTER FOR GYNECOLOGICAL EXAMINATION (GENERAL) (ROUTINE) W/OUT ABNORMAL FINDINGS: ICD-10-CM

## 2024-01-29 PROCEDURE — 99386 PREV VISIT NEW AGE 40-64: CPT

## 2024-01-29 NOTE — DISCUSSION/SUMMARY
[FreeTextEntry1] : 59 year old PMF here for wwe, nl exam - pap/hpv sent - sbe reveiwed, Mammo UTD - DexaUTD, vitamin D and calcium, weight bearing exercises recommended - colonoscopy referral - n/v 1 year for annual or prn - postmenopausal bleeding precautions given - gave info on revaree

## 2024-01-29 NOTE — HISTORY OF PRESENT ILLNESS
[FreeTextEntry1] : ZUHAIR KILLIAN is a 59 year PMF   here for annual. Follows with Dr. Lee for YANNA and dryness - doing PFT and coconut oil. Reports oil is helping.  Stopped estrace given bleeding from it in the past. Denies vaginal bleeding, discharge or pain. not sexually active.  Reports episode of postmenopausal bleeding after using vaginal estradiol cream. She had pelvic ultrasound done on 5/10/2023. I reviewed her pelvic ultrasound. Her endometrial thickness was 2 mm however endometrium was not well visualized. Her uterus measures 7 cm with anterior fibroid measuring 1.4 cm as well as a posterior lower uterine segment fibroid measuring 1.5 cm. Patient denies ongoing postmenopausal bleeding.  Gynhx: h/o Reg menses, No h/o STIs/cysts; h/o abn paps/fibroids Obhx:  x2   Last mammo:10/2023 Last Colonoscopy:  Last Pap smear:  Last dexa:10/2023 - osteopenia    PMH: lupus, antiphospholipid syndrome, GERD, asthma, TIA, anticoagulation with Coumadin PSH: Cardiac cath

## 2024-01-30 LAB — HPV HIGH+LOW RISK DNA PNL CVX: NOT DETECTED

## 2024-02-01 LAB — CYTOLOGY CVX/VAG DOC THIN PREP: ABNORMAL

## 2024-02-07 ENCOUNTER — APPOINTMENT (OUTPATIENT)
Dept: CARDIOLOGY | Facility: CLINIC | Age: 59
End: 2024-02-07
Payer: COMMERCIAL

## 2024-02-07 VITALS
WEIGHT: 193 LBS | DIASTOLIC BLOOD PRESSURE: 66 MMHG | HEIGHT: 67 IN | OXYGEN SATURATION: 98 % | HEART RATE: 86 BPM | BODY MASS INDEX: 30.29 KG/M2 | SYSTOLIC BLOOD PRESSURE: 150 MMHG

## 2024-02-07 PROCEDURE — 99214 OFFICE O/P EST MOD 30 MIN: CPT

## 2024-02-07 RX ORDER — AZELASTINE HYDROCHLORIDE 137 UG/1
0.1 SPRAY, METERED NASAL TWICE DAILY
Refills: 0 | Status: DISCONTINUED | COMMUNITY
Start: 2022-10-25 | End: 2024-02-07
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
## 2024-02-07 NOTE — DISCUSSION/SUMMARY
[Patient] : the patient [With Me] : with me [___ Month(s)] : in [unfilled] month(s) [FreeTextEntry1] : 59-year-old female with past medical as above presenting for routine follow-up.    1. HTN - not well controlled.  Discussed amlodipine vs HCTZ.  Pt concerned about electrolyte depletion.  Will start amlodipine 2.5mg with uptitration if necessary.   2. Aortic aneurysm -  stable size of 4.1-4.2 cm without change.  Plan on echo 3/2024 for surveillance.  3. APLS - on coumadin with an INR goal of 2.0-3.0; normally very well controlled.  RTC after echo.

## 2024-02-07 NOTE — REVIEW OF SYSTEMS
[Dyspnea on exertion] : not dyspnea during exertion [Chest Discomfort] : no chest discomfort [Wheezing] : no wheezing [Joint Pain] : joint pain [Under Stress] : under stress [Negative] : Heme/Lymph

## 2024-02-07 NOTE — CARDIOLOGY SUMMARY
[de-identified] : 9/3/2021: Normal sinus rhythm, nonspecific ST-T wave changes, poor R wave progression 11/8/21: NSR, unchanged from above.  11/27/2023: NSR, NSST [de-identified] : 2/12/21: EF 60-65%, minimal MR, normal LA size, ascending aorta 4.0 cm, minimal TR, minimal PI. [de-identified] : 3/22: Normal coronaries

## 2024-02-08 LAB
ALBUMIN SERPL ELPH-MCNC: 4.3 G/DL
ALP BLD-CCNC: 78 U/L
ALT SERPL-CCNC: 19 U/L
ANION GAP SERPL CALC-SCNC: 12 MMOL/L
AST SERPL-CCNC: 26 U/L
BILIRUB SERPL-MCNC: 0.4 MG/DL
BUN SERPL-MCNC: 11 MG/DL
C3 SERPL-MCNC: 122 MG/DL
C4 SERPL-MCNC: 20 MG/DL
CALCIUM SERPL-MCNC: 9.3 MG/DL
CHLORIDE SERPL-SCNC: 105 MMOL/L
CO2 SERPL-SCNC: 25 MMOL/L
CREAT SERPL-MCNC: 0.79 MG/DL
CRP SERPL-MCNC: 3 MG/L
DSDNA AB SER-ACNC: 16 IU/ML
EGFR: 85 ML/MIN/1.73M2
ERYTHROCYTE [SEDIMENTATION RATE] IN BLOOD BY WESTERGREN METHOD: 20 MM/HR
GLUCOSE SERPL-MCNC: 94 MG/DL
HBV CORE IGG+IGM SER QL: NONREACTIVE
HBV SURFACE AB SER QL: REACTIVE
HBV SURFACE AG SER QL: NONREACTIVE
HCT VFR BLD CALC: 42.9 %
HCV AB SER QL: NONREACTIVE
HCV S/CO RATIO: 0.19 S/CO
HGB BLD-MCNC: 13.8 G/DL
INR PPP: 2.38 RATIO
MCHC RBC-ENTMCNC: 30.7 PG
MCHC RBC-ENTMCNC: 32.2 GM/DL
MCV RBC AUTO: 95.5 FL
PLATELET # BLD AUTO: 242 K/UL
POTASSIUM SERPL-SCNC: 4.2 MMOL/L
PROT SERPL-MCNC: 6.9 G/DL
PT BLD: 26.2 SEC
RBC # BLD: 4.49 M/UL
RBC # FLD: 13.3 %
SODIUM SERPL-SCNC: 142 MMOL/L
WBC # FLD AUTO: 4.7 K/UL

## 2024-02-10 LAB
M TB IFN-G BLD-IMP: NEGATIVE
QUANTIFERON TB PLUS MITOGEN MINUS NIL: 4.56 IU/ML
QUANTIFERON TB PLUS NIL: 0.04 IU/ML
QUANTIFERON TB PLUS TB1 MINUS NIL: 0 IU/ML
QUANTIFERON TB PLUS TB2 MINUS NIL: 0 IU/ML

## 2024-03-02 ENCOUNTER — RX RENEWAL (OUTPATIENT)
Age: 59
End: 2024-03-02

## 2024-03-08 LAB — INR PPP: 2.58

## 2024-03-08 RX ORDER — AMLODIPINE BESYLATE 10 MG/1
10 TABLET ORAL
Qty: 90 | Refills: 0 | Status: ACTIVE | COMMUNITY

## 2024-03-08 RX ORDER — AMLODIPINE BESYLATE 5 MG/1
5 TABLET ORAL
Qty: 90 | Refills: 0 | Status: DISCONTINUED | COMMUNITY
End: 2024-03-08

## 2024-03-12 RX ORDER — LIDOCAINE HCL 4 %
4 LIQUID ROLL-ON (ML) TOPICAL
Qty: 30 | Refills: 2 | Status: DISCONTINUED | COMMUNITY
Start: 1900-01-01 | End: 2024-03-12

## 2024-03-15 ENCOUNTER — APPOINTMENT (OUTPATIENT)
Dept: CARDIOLOGY | Facility: CLINIC | Age: 59
End: 2024-03-15

## 2024-03-18 ENCOUNTER — APPOINTMENT (OUTPATIENT)
Dept: CARDIOLOGY | Facility: CLINIC | Age: 59
End: 2024-03-18
Payer: COMMERCIAL

## 2024-03-18 PROCEDURE — 76376 3D RENDER W/INTRP POSTPROCES: CPT

## 2024-03-18 PROCEDURE — 93306 TTE W/DOPPLER COMPLETE: CPT

## 2024-04-11 ENCOUNTER — LABORATORY RESULT (OUTPATIENT)
Age: 59
End: 2024-04-11

## 2024-04-15 LAB — INR PPP: 2.97

## 2024-04-24 ENCOUNTER — APPOINTMENT (OUTPATIENT)
Dept: RADIOLOGY | Facility: CLINIC | Age: 59
End: 2024-04-24
Payer: COMMERCIAL

## 2024-04-24 PROCEDURE — 73130 X-RAY EXAM OF HAND: CPT | Mod: LT

## 2024-04-29 ENCOUNTER — LABORATORY RESULT (OUTPATIENT)
Age: 59
End: 2024-04-29

## 2024-05-02 ENCOUNTER — APPOINTMENT (OUTPATIENT)
Dept: RHEUMATOLOGY | Facility: CLINIC | Age: 59
End: 2024-05-02
Payer: COMMERCIAL

## 2024-05-02 VITALS
HEIGHT: 67 IN | HEART RATE: 78 BPM | TEMPERATURE: 98.1 F | OXYGEN SATURATION: 99 % | DIASTOLIC BLOOD PRESSURE: 92 MMHG | BODY MASS INDEX: 29.82 KG/M2 | SYSTOLIC BLOOD PRESSURE: 156 MMHG | WEIGHT: 190 LBS

## 2024-05-02 DIAGNOSIS — I31.9 DISEASE OF PERICARDIUM, UNSPECIFIED: ICD-10-CM

## 2024-05-02 DIAGNOSIS — M32.9 SYSTEMIC LUPUS ERYTHEMATOSUS, UNSPECIFIED: ICD-10-CM

## 2024-05-02 DIAGNOSIS — Z79.899 OTHER LONG TERM (CURRENT) DRUG THERAPY: ICD-10-CM

## 2024-05-02 DIAGNOSIS — I73.00 RAYNAUD'S SYNDROME W/OUT GANGRENE: ICD-10-CM

## 2024-05-02 PROCEDURE — G2211 COMPLEX E/M VISIT ADD ON: CPT

## 2024-05-02 PROCEDURE — 99214 OFFICE O/P EST MOD 30 MIN: CPT

## 2024-05-02 RX ORDER — AZATHIOPRINE 50 1/1
50 TABLET ORAL
Qty: 180 | Refills: 0 | Status: DISCONTINUED | COMMUNITY
Start: 2023-10-26 | End: 2024-05-02

## 2024-05-02 RX ORDER — GABAPENTIN 300 MG/1
300 CAPSULE ORAL
Qty: 270 | Refills: 3 | Status: ACTIVE | COMMUNITY
Start: 2022-10-25 | End: 1900-01-01

## 2024-05-02 NOTE — ASSESSMENT
[FreeTextEntry1] : 60 y/o female w/ SLE, APLS, Hashimoto's, GERD, asthma presents as follow up for management of SLE and APLS. Pt had multiple TIAs diagnosed with APLS, now treated with Coumadin. Pt was diagnosed with SLE in age 33.  Pt reports joint pains, oral ulcers, rashes of hands, ankles, feet, dry eyes, dry mouth, dry skin, pericarditis, Raynaud's.  Pt has ligament tear of hips with R hip trochanteric bursitis and had steroid injection by osteopath in past. Pt has undergone PT.  Pt had COVID infection in 1/2022 with sciatica, fever, loss of taste/smell. Pt had J&J vaccine with recurrence of pericarditis x 6 months treated with colchicine and steroids. 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 Pt had peptic ulcer in past and is on anticoagulation would avoid NSAIDs.  Pt has had Hx of angioedema, multiple medication allergies, anaphylaxis  Patient is on HCQ 200mg PO BID for SLE (since age 33), Coumadin for APLS, and amitriptyline 20mg QHS for migraines.  Pt had cardiac cath 4/2021 which was non-obstructive.  Pt is changing provider for change in insurance.  Sister - ITP, Mother - Sweet syndrome   Pt has SLE by history based on positive RICKI, APLS, joint pains, oral ulcers, rash, sicca symptoms, pericarditis, Raynaud's. Workup by me with B2GP+, LA+, TPO+ and mild low Vit D. Otherwise normal/negative RICKI, RICKI subserologies, inflammatory markers, complements.   Pt has had pericarditis recurrence which was treated with colchicine. Pt's pericarditis is currently under control. 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   Pt's SLE symptoms including joint pains, rashes, oral ulcers, pericarditis and SLE activity labs have all been stable. Given that pt's symptoms and labwork are quiescent, I do not think pt needs addition of AZA at this time.  - Reviewed pt's prior labwork from 4/2024 by PCP ([employer] [facility]) which were normal. No labwork needed today. - c/w HCQ 200mg PO daily (advised to decrease from BID by ophthalmologist). HCQ dosage is <5mg/kg/day or <400mg/day to minimize risk of retinal toxicity.  Side effects of HCQ were discussed with the patient including but not limited to GI upset, retinal toxicity, QT prolongation, cytopenias, myopathy. Discussed the importance of yearly ophthalmology evaluation.  - Discussed that potential additional medications based on any organ involvement or refractory symptoms include AZA, MTX, Benlysta. 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  - Last Hep B/C, QTB negative 2/2024  - c/w gabapentin, tizanidine PRN  - APLS management per other specialists with coumadin, agree with current management.  - RTC 3 months for follow up.

## 2024-05-02 NOTE — HISTORY OF PRESENT ILLNESS
[FreeTextEntry1] : HISTORY:  60 y/o female w/ SLE, APLS, Hashimoto's, GERD, asthma presents as follow up for management of SLE and APLS. Pt had multiple TIAs diagnosed with APLS, now treated with Coumadin. Pt was diagnosed with SLE in age 33.  Pt reports joint pains, oral ulcers, rashes of hands, ankles, feet, dry eyes, dry mouth, dry skin, pericarditis, Raynaud's.  Pt has ligament tear of hips with R hip trochanteric bursitis and had steroid injection by osteopath in past. Pt has undergone PT.  Pt had COVID infection in 1/2022 with sciatica, fever, loss of taste/smell. Pt had J&J vaccine with recurrence of pericarditis x 6 months treated with colchicine and steroids. 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 Pt had peptic ulcer in past and is on anticoagulation would avoid NSAIDs.  Pt has had Hx of angioedema, multiple medication allergies, anaphylaxis  Patient is on HCQ 200mg PO BID for SLE (since age 33), Coumadin for APLS, and amitriptyline 20mg QHS for migraines.  Pt had cardiac cath 4/2021 which was non-obstructive.  Pt is changing provider for change in insurance.  Sister - ITP, Mother - Sweet syndrome   Pt has SLE by history based on positive RICKI, APLS, joint pains, oral ulcers, rash, sicca symptoms, pericarditis, Raynaud's. Workup by me with B2GP+, LA+, TPO+ and mild low Vit D. Otherwise normal/negative RICKI, RICKI subserologies, inflammatory markers, complements.   Pt has had pericarditis recurrence which was treated with colchicine. Pt's pericarditis is currently under control. 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   Pt was planned to be started on AZA but delayed due to multiple medical problems including infections, uncontrolled INR, facial trauma, etc.  Pt's BP has been consistently high ~150s-200s/100s.Pt is currently having sacral pain with sciatic pain down the b/l legs and GI symptoms - pt had similar symptoms with prior COVID infections but COVID tests have been negative.  I would agree completely that pt should hold off on starting a new medication until her long term medical conditions are under control.   INTERVAL HISTORY:  Since last visit, patient has been trying to control her BP. Pt states that her various symptoms including joint pains, rashes, oral ulcers, pericarditis have all been stable. Pt's SLE activity labs from last visit were normal as well. Pt continues on HCQ 200mg PO daily. Given that pt's symptoms and labwork are quiescent, I do not think pt needs addition of AZA at this time.  WORKUP:  Remarkable for (6/2022): B2GP IgA >150, LA+, TPO Ab+, Vit D 25-OH 24  Normal/neg (6/2022): CBC, CMP, ESR, CRP, U/A, UPCR, RICKI, dsDNA, SSA, SSB, Stokes, RNP, histone Ab, ribosomal P Ab, C3, C4, TG Ab, B2GP IgG/IgM, Juanita-1, SCl-70, centromere Ab, ANCAs, Direct Kuldeep, CPK, aldolase, FLC ratio, thyroid panel, TSH, B12, Folate, iron panel, TPMT level, Hep B/C, Quantiferon TB

## 2024-05-16 ENCOUNTER — APPOINTMENT (OUTPATIENT)
Dept: OPHTHALMOLOGY | Facility: CLINIC | Age: 59
End: 2024-05-16
Payer: COMMERCIAL

## 2024-05-16 ENCOUNTER — NON-APPOINTMENT (OUTPATIENT)
Age: 59
End: 2024-05-16

## 2024-05-16 PROCEDURE — 92134 CPTRZ OPH DX IMG PST SGM RTA: CPT

## 2024-05-16 PROCEDURE — 92014 COMPRE OPH EXAM EST PT 1/>: CPT

## 2024-05-28 ENCOUNTER — LABORATORY RESULT (OUTPATIENT)
Age: 59
End: 2024-05-28

## 2024-06-10 ENCOUNTER — APPOINTMENT (OUTPATIENT)
Dept: CARDIOLOGY | Facility: CLINIC | Age: 59
End: 2024-06-10
Payer: COMMERCIAL

## 2024-06-10 VITALS
BODY MASS INDEX: 31.55 KG/M2 | WEIGHT: 201 LBS | DIASTOLIC BLOOD PRESSURE: 80 MMHG | OXYGEN SATURATION: 98 % | HEART RATE: 69 BPM | HEIGHT: 67 IN | SYSTOLIC BLOOD PRESSURE: 144 MMHG

## 2024-06-10 DIAGNOSIS — R07.9 CHEST PAIN, UNSPECIFIED: ICD-10-CM

## 2024-06-10 DIAGNOSIS — R06.02 SHORTNESS OF BREATH: ICD-10-CM

## 2024-06-10 DIAGNOSIS — D68.61 ANTIPHOSPHOLIPID SYNDROME: ICD-10-CM

## 2024-06-10 DIAGNOSIS — I10 ESSENTIAL (PRIMARY) HYPERTENSION: ICD-10-CM

## 2024-06-10 DIAGNOSIS — I77.810 THORACIC AORTIC ECTASIA: ICD-10-CM

## 2024-06-10 PROCEDURE — G2211 COMPLEX E/M VISIT ADD ON: CPT

## 2024-06-10 PROCEDURE — 93000 ELECTROCARDIOGRAM COMPLETE: CPT

## 2024-06-10 PROCEDURE — 99214 OFFICE O/P EST MOD 30 MIN: CPT

## 2024-06-10 RX ORDER — FAMOTIDINE 40 MG/1
40 TABLET, FILM COATED ORAL TWICE DAILY
Refills: 0 | Status: ACTIVE | COMMUNITY

## 2024-06-10 RX ORDER — WARFARIN 7.5 MG/1
7.5 TABLET ORAL
Qty: 90 | Refills: 3 | Status: ACTIVE | COMMUNITY
Start: 2024-03-02 | End: 1900-01-01

## 2024-06-10 RX ORDER — IPRATROPIUM/ALBUTEROL SULFATE 0.5-3MG/3
0.5-2.5 (3) AMPUL FOR NEBULIZATION (ML) INHALATION
Refills: 0 | Status: ACTIVE | COMMUNITY

## 2024-06-10 RX ORDER — WARFARIN 10 MG/1
10 TABLET ORAL DAILY
Qty: 30 | Refills: 3 | Status: DISCONTINUED | COMMUNITY
Start: 2023-04-24 | End: 2024-06-10

## 2024-06-10 RX ORDER — WARFARIN 5 MG/1
5 TABLET ORAL
Qty: 90 | Refills: 3 | Status: ACTIVE | COMMUNITY

## 2024-06-10 RX ORDER — AMLODIPINE BESYLATE 5 MG/1
TABLET ORAL
Refills: 0 | Status: ACTIVE | COMMUNITY

## 2024-06-10 NOTE — DISCUSSION/SUMMARY
[Patient] : the patient [With Me] : with me [___ Month(s)] : in [unfilled] month(s) [EKG obtained to assist in diagnosis and management of assessed problem(s)] : EKG obtained to assist in diagnosis and management of assessed problem(s) [FreeTextEntry1] : 59-year-old female with past medical as above presenting for routine follow-up.    1. HTN - controlled, continue losartan 50 mg PO daily 2. Aortic aneurysm - stable size of 4.1-4.2 cm without change.  3. APLS - on coumadin with an INR goal of 2.0-3.0; normally very well controlled.  RTC 6 months

## 2024-06-10 NOTE — CARDIOLOGY SUMMARY
[de-identified] : 9/3/2021: Normal sinus rhythm, nonspecific ST-T wave changes, poor R wave progression 11/8/21: NSR, unchanged from above.  11/27/2023: NSR, NSST 6/10/2024: NSR, NSST [de-identified] : 2/12/21: EF 60-65%, minimal MR, normal LA size, ascending aorta 4.0 cm, minimal TR, minimal PI. [de-identified] : 3/22: Normal coronaries  used

## 2024-06-10 NOTE — REVIEW OF SYSTEMS
[Joint Pain] : joint pain [Under Stress] : under stress [Negative] : Heme/Lymph [Dyspnea on exertion] : not dyspnea during exertion [Chest Discomfort] : no chest discomfort [Wheezing] : no wheezing

## 2024-06-10 NOTE — HISTORY OF PRESENT ILLNESS
[FreeTextEntry1] : 59F w/ PMH of SLE with recurrent pericarditis, hypothyroidism, APLS on coumadin, asthma presenting to establish care with cardiology.  She has had many bout of recurrent pericarditis most related to SLE flares.  She has been on and off colchicine as well as steroids in the past.  She denies angina chest pain or significant SOB.  She has her INR monitored by venipuncture as her finger sticks have been unreliable.  6/10/2024: Has a migraine today but otherwise feeling well.  Denies anginal chest pains and SOB. INRs within range.

## 2024-06-24 RX ORDER — HYDROXYCHLOROQUINE SULFATE 200 MG/1
200 TABLET, FILM COATED ORAL
Qty: 180 | Refills: 1 | Status: ACTIVE | COMMUNITY
Start: 2020-10-05 | End: 1900-01-01

## 2024-06-29 ENCOUNTER — LABORATORY RESULT (OUTPATIENT)
Age: 59
End: 2024-06-29

## 2024-07-30 LAB — INR PPP: 1.99

## 2024-08-28 ENCOUNTER — LABORATORY RESULT (OUTPATIENT)
Age: 59
End: 2024-08-28

## 2024-09-13 ENCOUNTER — APPOINTMENT (OUTPATIENT)
Dept: ULTRASOUND IMAGING | Facility: CLINIC | Age: 59
End: 2024-09-13
Payer: COMMERCIAL

## 2024-09-13 PROCEDURE — 76700 US EXAM ABDOM COMPLETE: CPT

## 2024-09-13 PROCEDURE — 76857 US EXAM PELVIC LIMITED: CPT

## 2024-10-01 ENCOUNTER — LABORATORY RESULT (OUTPATIENT)
Age: 59
End: 2024-10-01

## 2024-10-15 ENCOUNTER — APPOINTMENT (OUTPATIENT)
Dept: CT IMAGING | Facility: CLINIC | Age: 59
End: 2024-10-15
Payer: COMMERCIAL

## 2024-10-15 PROCEDURE — 74178 CT ABD&PLV WO CNTR FLWD CNTR: CPT

## 2024-10-31 ENCOUNTER — LABORATORY RESULT (OUTPATIENT)
Age: 59
End: 2024-10-31

## 2024-10-31 ENCOUNTER — APPOINTMENT (OUTPATIENT)
Dept: MAMMOGRAPHY | Facility: CLINIC | Age: 59
End: 2024-10-31
Payer: COMMERCIAL

## 2024-10-31 PROCEDURE — 77067 SCR MAMMO BI INCL CAD: CPT

## 2024-10-31 PROCEDURE — 77063 BREAST TOMOSYNTHESIS BI: CPT

## 2024-11-05 ENCOUNTER — APPOINTMENT (OUTPATIENT)
Dept: ULTRASOUND IMAGING | Facility: CLINIC | Age: 59
End: 2024-11-05
Payer: COMMERCIAL

## 2024-11-05 PROCEDURE — 76642 ULTRASOUND BREAST LIMITED: CPT | Mod: LT

## 2024-11-08 ENCOUNTER — LABORATORY RESULT (OUTPATIENT)
Age: 59
End: 2024-11-08

## 2024-11-11 ENCOUNTER — APPOINTMENT (OUTPATIENT)
Dept: RHEUMATOLOGY | Facility: CLINIC | Age: 59
End: 2024-11-11
Payer: COMMERCIAL

## 2024-11-11 VITALS — OXYGEN SATURATION: 98 % | SYSTOLIC BLOOD PRESSURE: 155 MMHG | DIASTOLIC BLOOD PRESSURE: 86 MMHG | HEART RATE: 80 BPM

## 2024-11-11 DIAGNOSIS — D68.61 ANTIPHOSPHOLIPID SYNDROME: ICD-10-CM

## 2024-11-11 DIAGNOSIS — Z79.899 OTHER LONG TERM (CURRENT) DRUG THERAPY: ICD-10-CM

## 2024-11-11 DIAGNOSIS — M32.9 SYSTEMIC LUPUS ERYTHEMATOSUS, UNSPECIFIED: ICD-10-CM

## 2024-11-11 DIAGNOSIS — E06.3 AUTOIMMUNE THYROIDITIS: ICD-10-CM

## 2024-11-11 PROCEDURE — G2211 COMPLEX E/M VISIT ADD ON: CPT

## 2024-11-11 PROCEDURE — 99214 OFFICE O/P EST MOD 30 MIN: CPT

## 2024-11-13 ENCOUNTER — OUTPATIENT (OUTPATIENT)
Dept: OUTPATIENT SERVICES | Facility: HOSPITAL | Age: 59
LOS: 1 days | End: 2024-11-13
Payer: COMMERCIAL

## 2024-11-13 ENCOUNTER — APPOINTMENT (OUTPATIENT)
Dept: ULTRASOUND IMAGING | Facility: CLINIC | Age: 59
End: 2024-11-13
Payer: COMMERCIAL

## 2024-11-13 DIAGNOSIS — R92.8 OTHER ABNORMAL AND INCONCLUSIVE FINDINGS ON DIAGNOSTIC IMAGING OF BREAST: ICD-10-CM

## 2024-11-13 PROCEDURE — 76642 ULTRASOUND BREAST LIMITED: CPT | Mod: 26,LT

## 2024-11-13 PROCEDURE — 76642 ULTRASOUND BREAST LIMITED: CPT

## 2024-11-14 ENCOUNTER — APPOINTMENT (OUTPATIENT)
Dept: OPHTHALMOLOGY | Facility: CLINIC | Age: 59
End: 2024-11-14
Payer: COMMERCIAL

## 2024-11-14 ENCOUNTER — NON-APPOINTMENT (OUTPATIENT)
Age: 59
End: 2024-11-14

## 2024-11-14 PROCEDURE — 92134 CPTRZ OPH DX IMG PST SGM RTA: CPT

## 2024-11-14 PROCEDURE — 92014 COMPRE OPH EXAM EST PT 1/>: CPT

## 2024-11-19 LAB
INR PPP: 2.7
PT BLD: 31.5

## 2024-11-25 ENCOUNTER — APPOINTMENT (OUTPATIENT)
Dept: BREAST CENTER | Facility: CLINIC | Age: 59
End: 2024-11-25
Payer: COMMERCIAL

## 2024-11-25 ENCOUNTER — APPOINTMENT (OUTPATIENT)
Dept: CARDIOLOGY | Facility: CLINIC | Age: 59
End: 2024-11-25
Payer: COMMERCIAL

## 2024-11-25 ENCOUNTER — NON-APPOINTMENT (OUTPATIENT)
Age: 59
End: 2024-11-25

## 2024-11-25 VITALS
HEART RATE: 71 BPM | WEIGHT: 196 LBS | OXYGEN SATURATION: 100 % | TEMPERATURE: 98 F | DIASTOLIC BLOOD PRESSURE: 79 MMHG | HEIGHT: 67 IN | BODY MASS INDEX: 30.76 KG/M2 | SYSTOLIC BLOOD PRESSURE: 132 MMHG

## 2024-11-25 DIAGNOSIS — I31.9 DISEASE OF PERICARDIUM, UNSPECIFIED: ICD-10-CM

## 2024-11-25 DIAGNOSIS — I10 ESSENTIAL (PRIMARY) HYPERTENSION: ICD-10-CM

## 2024-11-25 DIAGNOSIS — Z91.89 OTHER SPECIFIED PERSONAL RISK FACTORS, NOT ELSEWHERE CLASSIFIED: ICD-10-CM

## 2024-11-25 DIAGNOSIS — Z78.9 OTHER SPECIFIED HEALTH STATUS: ICD-10-CM

## 2024-11-25 DIAGNOSIS — Z80.41 FAMILY HISTORY OF MALIGNANT NEOPLASM OF OVARY: ICD-10-CM

## 2024-11-25 DIAGNOSIS — R92.8 OTHER ABNORMAL AND INCONCLUSIVE FINDINGS ON DIAGNOSTIC IMAGING OF BREAST: ICD-10-CM

## 2024-11-25 DIAGNOSIS — I77.810 THORACIC AORTIC ECTASIA: ICD-10-CM

## 2024-11-25 PROCEDURE — 99214 OFFICE O/P EST MOD 30 MIN: CPT

## 2024-11-25 PROCEDURE — 93000 ELECTROCARDIOGRAM COMPLETE: CPT

## 2024-11-25 PROCEDURE — G2211 COMPLEX E/M VISIT ADD ON: CPT

## 2024-11-25 RX ORDER — OMEPRAZOLE 20 MG/1
20 CAPSULE, DELAYED RELEASE ORAL DAILY
Refills: 0 | Status: ACTIVE | COMMUNITY

## 2024-11-26 ENCOUNTER — APPOINTMENT (OUTPATIENT)
Dept: ULTRASOUND IMAGING | Facility: CLINIC | Age: 59
End: 2024-11-26

## 2024-11-26 PROCEDURE — 76536 US EXAM OF HEAD AND NECK: CPT

## 2024-11-27 ENCOUNTER — APPOINTMENT (OUTPATIENT)
Dept: ULTRASOUND IMAGING | Facility: CLINIC | Age: 59
End: 2024-11-27

## 2024-12-09 ENCOUNTER — APPOINTMENT (OUTPATIENT)
Dept: CARDIOLOGY | Facility: CLINIC | Age: 59
End: 2024-12-09

## 2024-12-10 ENCOUNTER — OUTPATIENT (OUTPATIENT)
Dept: OUTPATIENT SERVICES | Facility: HOSPITAL | Age: 59
LOS: 1 days | End: 2024-12-10

## 2024-12-10 DIAGNOSIS — D68.61 ANTIPHOSPHOLIPID SYNDROME: ICD-10-CM

## 2024-12-11 ENCOUNTER — LABORATORY RESULT (OUTPATIENT)
Age: 59
End: 2024-12-11

## 2024-12-11 ENCOUNTER — RESULT REVIEW (OUTPATIENT)
Age: 59
End: 2024-12-11

## 2024-12-11 ENCOUNTER — APPOINTMENT (OUTPATIENT)
Dept: HEMATOLOGY ONCOLOGY | Facility: CLINIC | Age: 59
End: 2024-12-11
Payer: COMMERCIAL

## 2024-12-11 ENCOUNTER — NON-APPOINTMENT (OUTPATIENT)
Age: 59
End: 2024-12-11

## 2024-12-11 VITALS
HEART RATE: 97 BPM | RESPIRATION RATE: 18 BRPM | WEIGHT: 192.38 LBS | TEMPERATURE: 97 F | SYSTOLIC BLOOD PRESSURE: 132 MMHG | OXYGEN SATURATION: 98 % | BODY MASS INDEX: 30.55 KG/M2 | DIASTOLIC BLOOD PRESSURE: 83 MMHG | HEIGHT: 66.54 IN

## 2024-12-11 DIAGNOSIS — D68.61 ANTIPHOSPHOLIPID SYNDROME: ICD-10-CM

## 2024-12-11 LAB
BASOPHILS # BLD AUTO: 0.07 K/UL — SIGNIFICANT CHANGE UP (ref 0–0.2)
BASOPHILS NFR BLD AUTO: 1.2 % — SIGNIFICANT CHANGE UP (ref 0–2)
EOSINOPHIL # BLD AUTO: 0.27 K/UL — SIGNIFICANT CHANGE UP (ref 0–0.5)
EOSINOPHIL NFR BLD AUTO: 4.7 % — SIGNIFICANT CHANGE UP (ref 0–6)
HCT VFR BLD CALC: 41.5 % — SIGNIFICANT CHANGE UP (ref 34.5–45)
HGB BLD-MCNC: 14.1 G/DL — SIGNIFICANT CHANGE UP (ref 11.5–15.5)
IMM GRANULOCYTES NFR BLD AUTO: 0.5 % — SIGNIFICANT CHANGE UP (ref 0–0.9)
LYMPHOCYTES # BLD AUTO: 1.17 K/UL — SIGNIFICANT CHANGE UP (ref 1–3.3)
LYMPHOCYTES # BLD AUTO: 20.2 % — SIGNIFICANT CHANGE UP (ref 13–44)
MCHC RBC-ENTMCNC: 31.5 PG — SIGNIFICANT CHANGE UP (ref 27–34)
MCHC RBC-ENTMCNC: 34 G/DL — SIGNIFICANT CHANGE UP (ref 32–36)
MCV RBC AUTO: 92.6 FL — SIGNIFICANT CHANGE UP (ref 80–100)
MONOCYTES # BLD AUTO: 0.61 K/UL — SIGNIFICANT CHANGE UP (ref 0–0.9)
MONOCYTES NFR BLD AUTO: 10.5 % — SIGNIFICANT CHANGE UP (ref 2–14)
NEUTROPHILS # BLD AUTO: 3.65 K/UL — SIGNIFICANT CHANGE UP (ref 1.8–7.4)
NEUTROPHILS NFR BLD AUTO: 62.9 % — SIGNIFICANT CHANGE UP (ref 43–77)
NRBC # BLD: 0 /100 WBCS — SIGNIFICANT CHANGE UP (ref 0–0)
NRBC BLD-RTO: 0 /100 WBCS — SIGNIFICANT CHANGE UP (ref 0–0)
PLATELET # BLD AUTO: 284 K/UL — SIGNIFICANT CHANGE UP (ref 150–400)
RBC # BLD: 4.48 M/UL — SIGNIFICANT CHANGE UP (ref 3.8–5.2)
RBC # FLD: 12.7 % — SIGNIFICANT CHANGE UP (ref 10.3–14.5)
WBC # BLD: 5.8 K/UL — SIGNIFICANT CHANGE UP (ref 3.8–10.5)
WBC # FLD AUTO: 5.8 K/UL — SIGNIFICANT CHANGE UP (ref 3.8–10.5)

## 2024-12-11 PROCEDURE — 85027 COMPLETE CBC AUTOMATED: CPT

## 2024-12-11 PROCEDURE — 99205 OFFICE O/P NEW HI 60 MIN: CPT

## 2024-12-11 PROCEDURE — G2211 COMPLEX E/M VISIT ADD ON: CPT

## 2024-12-11 RX ORDER — AMLODIPINE BESYLATE 5 MG/1
5 TABLET ORAL
Refills: 0 | Status: ACTIVE | COMMUNITY

## 2024-12-11 RX ORDER — EPINASTINE HYDROCHLORIDE 0.5 MG/ML
0.05 SOLUTION/ DROPS OPHTHALMIC
Refills: 0 | Status: ACTIVE | COMMUNITY

## 2024-12-11 RX ORDER — ALBUTEROL SULFATE 2.5 MG/3ML
0.08 VIAL, NEBULIZER (ML) INHALATION
Refills: 0 | Status: ACTIVE | COMMUNITY

## 2024-12-12 ENCOUNTER — NON-APPOINTMENT (OUTPATIENT)
Age: 59
End: 2024-12-12

## 2024-12-12 LAB
ALBUMIN SERPL ELPH-MCNC: 4.4 G/DL
ALP BLD-CCNC: 76 U/L
ALT SERPL-CCNC: 31 U/L
ANION GAP SERPL CALC-SCNC: 16 MMOL/L
AST SERPL-CCNC: 26 U/L
BILIRUB SERPL-MCNC: 0.2 MG/DL
BUN SERPL-MCNC: 10 MG/DL
CALCIUM SERPL-MCNC: 9.5 MG/DL
CHLORIDE SERPL-SCNC: 105 MMOL/L
CO2 SERPL-SCNC: 22 MMOL/L
CREAT SERPL-MCNC: 0.96 MG/DL
EGFR: 67 ML/MIN/1.73M2
GLUCOSE SERPL-MCNC: 88 MG/DL
INR PPP: 1.9 RATIO
POTASSIUM SERPL-SCNC: 4.2 MMOL/L
PROT SERPL-MCNC: 7.3 G/DL
PT BLD: 22.3 SEC
SODIUM SERPL-SCNC: 143 MMOL/L

## 2024-12-16 ENCOUNTER — LABORATORY RESULT (OUTPATIENT)
Age: 59
End: 2024-12-16

## 2024-12-31 LAB — INR PPP: 1.97

## 2025-01-02 ENCOUNTER — LABORATORY RESULT (OUTPATIENT)
Age: 60
End: 2025-01-02

## 2025-02-11 ENCOUNTER — LABORATORY RESULT (OUTPATIENT)
Age: 60
End: 2025-02-11

## 2025-02-12 ENCOUNTER — APPOINTMENT (OUTPATIENT)
Dept: RHEUMATOLOGY | Facility: CLINIC | Age: 60
End: 2025-02-12
Payer: COMMERCIAL

## 2025-02-12 VITALS
OXYGEN SATURATION: 99 % | BODY MASS INDEX: 30.86 KG/M2 | HEIGHT: 66 IN | WEIGHT: 192 LBS | TEMPERATURE: 97.8 F | DIASTOLIC BLOOD PRESSURE: 82 MMHG | SYSTOLIC BLOOD PRESSURE: 141 MMHG | HEART RATE: 62 BPM

## 2025-02-12 DIAGNOSIS — Z79.899 OTHER LONG TERM (CURRENT) DRUG THERAPY: ICD-10-CM

## 2025-02-12 DIAGNOSIS — D68.61 ANTIPHOSPHOLIPID SYNDROME: ICD-10-CM

## 2025-02-12 DIAGNOSIS — M32.9 SYSTEMIC LUPUS ERYTHEMATOSUS, UNSPECIFIED: ICD-10-CM

## 2025-02-12 DIAGNOSIS — E06.3 AUTOIMMUNE THYROIDITIS: ICD-10-CM

## 2025-02-12 PROCEDURE — 99214 OFFICE O/P EST MOD 30 MIN: CPT

## 2025-02-17 ENCOUNTER — APPOINTMENT (OUTPATIENT)
Dept: OPHTHALMOLOGY | Facility: CLINIC | Age: 60
End: 2025-02-17
Payer: COMMERCIAL

## 2025-02-17 ENCOUNTER — NON-APPOINTMENT (OUTPATIENT)
Age: 60
End: 2025-02-17

## 2025-02-17 PROCEDURE — 99213 OFFICE O/P EST LOW 20 MIN: CPT

## 2025-02-24 ENCOUNTER — LABORATORY RESULT (OUTPATIENT)
Age: 60
End: 2025-02-24

## 2025-02-28 ENCOUNTER — LABORATORY RESULT (OUTPATIENT)
Age: 60
End: 2025-02-28

## 2025-03-11 ENCOUNTER — NON-APPOINTMENT (OUTPATIENT)
Age: 60
End: 2025-03-11

## 2025-03-11 ENCOUNTER — APPOINTMENT (OUTPATIENT)
Dept: OPHTHALMOLOGY | Facility: CLINIC | Age: 60
End: 2025-03-11
Payer: COMMERCIAL

## 2025-03-11 PROCEDURE — 99213 OFFICE O/P EST LOW 20 MIN: CPT

## 2025-03-25 ENCOUNTER — OUTPATIENT (OUTPATIENT)
Dept: OUTPATIENT SERVICES | Facility: HOSPITAL | Age: 60
LOS: 1 days | End: 2025-03-25

## 2025-03-25 DIAGNOSIS — D68.61 ANTIPHOSPHOLIPID SYNDROME: ICD-10-CM

## 2025-03-27 ENCOUNTER — LABORATORY RESULT (OUTPATIENT)
Age: 60
End: 2025-03-27

## 2025-03-31 ENCOUNTER — NON-APPOINTMENT (OUTPATIENT)
Age: 60
End: 2025-03-31

## 2025-03-31 ENCOUNTER — APPOINTMENT (OUTPATIENT)
Dept: HEMATOLOGY ONCOLOGY | Facility: CLINIC | Age: 60
End: 2025-03-31
Payer: COMMERCIAL

## 2025-03-31 VITALS
OXYGEN SATURATION: 99 % | DIASTOLIC BLOOD PRESSURE: 83 MMHG | SYSTOLIC BLOOD PRESSURE: 137 MMHG | TEMPERATURE: 98.1 F | BODY MASS INDEX: 30.05 KG/M2 | HEIGHT: 66 IN | WEIGHT: 187 LBS | HEART RATE: 67 BPM

## 2025-03-31 DIAGNOSIS — D68.61 ANTIPHOSPHOLIPID SYNDROME: ICD-10-CM

## 2025-03-31 PROCEDURE — 99214 OFFICE O/P EST MOD 30 MIN: CPT

## 2025-03-31 PROCEDURE — G2211 COMPLEX E/M VISIT ADD ON: CPT

## 2025-04-16 ENCOUNTER — LABORATORY RESULT (OUTPATIENT)
Age: 60
End: 2025-04-16

## 2025-04-22 ENCOUNTER — APPOINTMENT (OUTPATIENT)
Dept: OPHTHALMOLOGY | Facility: CLINIC | Age: 60
End: 2025-04-22
Payer: COMMERCIAL

## 2025-04-22 ENCOUNTER — NON-APPOINTMENT (OUTPATIENT)
Age: 60
End: 2025-04-22

## 2025-04-22 PROCEDURE — 99213 OFFICE O/P EST LOW 20 MIN: CPT

## 2025-05-02 ENCOUNTER — NON-APPOINTMENT (OUTPATIENT)
Age: 60
End: 2025-05-02

## 2025-05-02 ENCOUNTER — LABORATORY RESULT (OUTPATIENT)
Age: 60
End: 2025-05-02

## 2025-05-03 ENCOUNTER — LABORATORY RESULT (OUTPATIENT)
Age: 60
End: 2025-05-03

## 2025-05-05 ENCOUNTER — LABORATORY RESULT (OUTPATIENT)
Age: 60
End: 2025-05-05

## 2025-05-06 ENCOUNTER — NON-APPOINTMENT (OUTPATIENT)
Age: 60
End: 2025-05-06

## 2025-05-14 ENCOUNTER — APPOINTMENT (OUTPATIENT)
Dept: RHEUMATOLOGY | Facility: CLINIC | Age: 60
End: 2025-05-14
Payer: COMMERCIAL

## 2025-05-14 DIAGNOSIS — E06.3 AUTOIMMUNE THYROIDITIS: ICD-10-CM

## 2025-05-14 DIAGNOSIS — D68.61 ANTIPHOSPHOLIPID SYNDROME: ICD-10-CM

## 2025-05-14 DIAGNOSIS — M32.9 SYSTEMIC LUPUS ERYTHEMATOSUS, UNSPECIFIED: ICD-10-CM

## 2025-05-14 DIAGNOSIS — M35.00 SICCA SYNDROME, UNSPECIFIED: ICD-10-CM

## 2025-05-14 DIAGNOSIS — Z79.899 OTHER LONG TERM (CURRENT) DRUG THERAPY: ICD-10-CM

## 2025-05-14 PROCEDURE — 99214 OFFICE O/P EST MOD 30 MIN: CPT | Mod: 95

## 2025-05-14 PROCEDURE — G2211 COMPLEX E/M VISIT ADD ON: CPT | Mod: 95

## 2025-05-14 RX ORDER — CEVIMELINE HYDROCHLORIDE 30 MG/1
30 CAPSULE ORAL
Qty: 90 | Refills: 1 | Status: ACTIVE | COMMUNITY
Start: 2025-05-14 | End: 1900-01-01

## 2025-05-29 ENCOUNTER — LABORATORY RESULT (OUTPATIENT)
Age: 60
End: 2025-05-29

## 2025-05-29 ENCOUNTER — APPOINTMENT (OUTPATIENT)
Dept: ULTRASOUND IMAGING | Facility: CLINIC | Age: 60
End: 2025-05-29
Payer: COMMERCIAL

## 2025-05-29 PROCEDURE — 76705 ECHO EXAM OF ABDOMEN: CPT

## 2025-05-30 ENCOUNTER — NON-APPOINTMENT (OUTPATIENT)
Age: 60
End: 2025-05-30

## 2025-06-14 ENCOUNTER — LABORATORY RESULT (OUTPATIENT)
Age: 60
End: 2025-06-14

## 2025-06-17 ENCOUNTER — NON-APPOINTMENT (OUTPATIENT)
Age: 60
End: 2025-06-17

## 2025-06-19 ENCOUNTER — OUTPATIENT (OUTPATIENT)
Dept: OUTPATIENT SERVICES | Facility: HOSPITAL | Age: 60
LOS: 1 days | End: 2025-06-19

## 2025-06-19 DIAGNOSIS — D68.61 ANTIPHOSPHOLIPID SYNDROME: ICD-10-CM

## 2025-06-23 ENCOUNTER — APPOINTMENT (OUTPATIENT)
Dept: CARDIOLOGY | Facility: CLINIC | Age: 60
End: 2025-06-23
Payer: COMMERCIAL

## 2025-06-23 VITALS
OXYGEN SATURATION: 97 % | SYSTOLIC BLOOD PRESSURE: 118 MMHG | DIASTOLIC BLOOD PRESSURE: 62 MMHG | HEART RATE: 62 BPM | HEIGHT: 66 IN | BODY MASS INDEX: 31.18 KG/M2 | WEIGHT: 194 LBS

## 2025-06-23 PROBLEM — M72.2 PLANTAR FASCIA SYNDROME: Status: RESOLVED | Noted: 2023-12-20 | Resolved: 2025-06-23

## 2025-06-23 PROCEDURE — 93000 ELECTROCARDIOGRAM COMPLETE: CPT

## 2025-06-23 PROCEDURE — G2211 COMPLEX E/M VISIT ADD ON: CPT

## 2025-06-23 PROCEDURE — 99214 OFFICE O/P EST MOD 30 MIN: CPT

## 2025-06-23 RX ORDER — HYDROXYCHLOROQUINE SULFATE 200 MG/1
200 TABLET ORAL DAILY
Refills: 0 | Status: ACTIVE | COMMUNITY

## 2025-06-23 RX ORDER — LIFITEGRAST 50 MG/ML
5 SOLUTION/ DROPS OPHTHALMIC
Refills: 0 | Status: ACTIVE | COMMUNITY

## 2025-06-23 RX ORDER — FAMOTIDINE 40 MG/1
40 TABLET, FILM COATED ORAL DAILY
Refills: 0 | Status: ACTIVE | COMMUNITY

## 2025-06-25 ENCOUNTER — APPOINTMENT (OUTPATIENT)
Dept: HEMATOLOGY ONCOLOGY | Facility: CLINIC | Age: 60
End: 2025-06-25
Payer: COMMERCIAL

## 2025-06-25 ENCOUNTER — RESULT REVIEW (OUTPATIENT)
Age: 60
End: 2025-06-25

## 2025-06-25 VITALS
TEMPERATURE: 97 F | HEIGHT: 66 IN | WEIGHT: 190 LBS | HEART RATE: 76 BPM | OXYGEN SATURATION: 96 % | SYSTOLIC BLOOD PRESSURE: 140 MMHG | DIASTOLIC BLOOD PRESSURE: 81 MMHG | BODY MASS INDEX: 30.53 KG/M2 | RESPIRATION RATE: 16 BRPM

## 2025-06-25 LAB
BASOPHILS # BLD AUTO: 0.08 K/UL — SIGNIFICANT CHANGE UP (ref 0–0.2)
BASOPHILS NFR BLD AUTO: 1.2 % — SIGNIFICANT CHANGE UP (ref 0–2)
EOSINOPHIL # BLD AUTO: 0.33 K/UL — SIGNIFICANT CHANGE UP (ref 0–0.5)
EOSINOPHIL NFR BLD AUTO: 4.8 % — SIGNIFICANT CHANGE UP (ref 0–6)
HCT VFR BLD CALC: 41.1 % — SIGNIFICANT CHANGE UP (ref 34.5–45)
HGB BLD-MCNC: 13.8 G/DL — SIGNIFICANT CHANGE UP (ref 11.5–15.5)
IMM GRANULOCYTES # BLD AUTO: 0.02 K/UL — SIGNIFICANT CHANGE UP (ref 0–0.07)
IMM GRANULOCYTES NFR BLD AUTO: 0.3 % — SIGNIFICANT CHANGE UP (ref 0–0.9)
LYMPHOCYTES # BLD AUTO: 1.23 K/UL — SIGNIFICANT CHANGE UP (ref 1–3.3)
LYMPHOCYTES NFR BLD AUTO: 17.7 % — SIGNIFICANT CHANGE UP (ref 13–44)
MCHC RBC-ENTMCNC: 31.3 PG — SIGNIFICANT CHANGE UP (ref 27–34)
MCHC RBC-ENTMCNC: 33.6 G/DL — SIGNIFICANT CHANGE UP (ref 32–36)
MCV RBC AUTO: 93.2 FL — SIGNIFICANT CHANGE UP (ref 80–100)
MONOCYTES # BLD AUTO: 0.6 K/UL — SIGNIFICANT CHANGE UP (ref 0–0.9)
MONOCYTES NFR BLD AUTO: 8.7 % — SIGNIFICANT CHANGE UP (ref 2–14)
NEUTROPHILS # BLD AUTO: 4.67 K/UL — SIGNIFICANT CHANGE UP (ref 1.8–7.4)
NEUTROPHILS NFR BLD AUTO: 67.3 % — SIGNIFICANT CHANGE UP (ref 43–77)
NRBC # BLD AUTO: 0 K/UL — SIGNIFICANT CHANGE UP (ref 0–0)
NRBC # FLD: 0 K/UL — SIGNIFICANT CHANGE UP (ref 0–0)
NRBC BLD AUTO-RTO: 0 /100 WBCS — SIGNIFICANT CHANGE UP (ref 0–0)
PLATELET # BLD AUTO: 222 K/UL — SIGNIFICANT CHANGE UP (ref 150–400)
PMV BLD: 11.6 FL — SIGNIFICANT CHANGE UP (ref 7–13)
RBC # BLD: 4.41 M/UL — SIGNIFICANT CHANGE UP (ref 3.8–5.2)
RBC # FLD: 12.5 % — SIGNIFICANT CHANGE UP (ref 10.3–14.5)
WBC # BLD: 6.93 K/UL — SIGNIFICANT CHANGE UP (ref 3.8–10.5)
WBC # FLD AUTO: 6.93 K/UL — SIGNIFICANT CHANGE UP (ref 3.8–10.5)

## 2025-06-25 PROCEDURE — G2211 COMPLEX E/M VISIT ADD ON: CPT

## 2025-06-25 PROCEDURE — 36415 COLL VENOUS BLD VENIPUNCTURE: CPT

## 2025-06-25 PROCEDURE — 99214 OFFICE O/P EST MOD 30 MIN: CPT

## 2025-06-25 PROCEDURE — 85025 COMPLETE CBC W/AUTO DIFF WBC: CPT

## 2025-06-30 LAB
INR PPP: 2.12 RATIO
PT BLD: 24.8 SEC

## 2025-07-10 ENCOUNTER — APPOINTMENT (OUTPATIENT)
Dept: OPHTHALMOLOGY | Facility: CLINIC | Age: 60
End: 2025-07-10

## 2025-07-11 ENCOUNTER — LABORATORY RESULT (OUTPATIENT)
Age: 60
End: 2025-07-11

## 2025-07-14 ENCOUNTER — APPOINTMENT (OUTPATIENT)
Dept: CARDIOLOGY | Facility: CLINIC | Age: 60
End: 2025-07-14
Payer: COMMERCIAL

## 2025-07-14 PROCEDURE — 93356 MYOCRD STRAIN IMG SPCKL TRCK: CPT

## 2025-07-14 PROCEDURE — 93306 TTE W/DOPPLER COMPLETE: CPT

## 2025-07-24 ENCOUNTER — RESULT REVIEW (OUTPATIENT)
Age: 60
End: 2025-07-24

## 2025-07-24 ENCOUNTER — APPOINTMENT (OUTPATIENT)
Dept: MAMMOGRAPHY | Facility: CLINIC | Age: 60
End: 2025-07-24

## 2025-07-24 ENCOUNTER — APPOINTMENT (OUTPATIENT)
Dept: ULTRASOUND IMAGING | Facility: CLINIC | Age: 60
End: 2025-07-24
Payer: COMMERCIAL

## 2025-07-24 PROCEDURE — G0279: CPT | Mod: LT

## 2025-07-24 PROCEDURE — 77065 DX MAMMO INCL CAD UNI: CPT | Mod: LT

## 2025-07-24 PROCEDURE — 76642 ULTRASOUND BREAST LIMITED: CPT | Mod: LT

## 2025-07-25 ENCOUNTER — APPOINTMENT (OUTPATIENT)
Dept: OPHTHALMOLOGY | Facility: CLINIC | Age: 60
End: 2025-07-25
Payer: COMMERCIAL

## 2025-07-25 ENCOUNTER — NON-APPOINTMENT (OUTPATIENT)
Age: 60
End: 2025-07-25

## 2025-07-25 PROCEDURE — 92002 INTRM OPH EXAM NEW PATIENT: CPT

## 2025-07-30 ENCOUNTER — NON-APPOINTMENT (OUTPATIENT)
Age: 60
End: 2025-07-30

## 2025-08-05 ENCOUNTER — APPOINTMENT (OUTPATIENT)
Dept: CARDIOLOGY | Facility: CLINIC | Age: 60
End: 2025-08-05
Payer: COMMERCIAL

## 2025-08-05 VITALS
BODY MASS INDEX: 30.67 KG/M2 | HEART RATE: 64 BPM | WEIGHT: 190 LBS | OXYGEN SATURATION: 97 % | DIASTOLIC BLOOD PRESSURE: 72 MMHG | SYSTOLIC BLOOD PRESSURE: 134 MMHG

## 2025-08-05 DIAGNOSIS — E78.5 HYPERLIPIDEMIA, UNSPECIFIED: ICD-10-CM

## 2025-08-05 DIAGNOSIS — Z71.89 OTHER SPECIFIED COUNSELING: ICD-10-CM

## 2025-08-05 DIAGNOSIS — E66.09 OBESITY, CLASS 1: ICD-10-CM

## 2025-08-05 DIAGNOSIS — I10 ESSENTIAL (PRIMARY) HYPERTENSION: ICD-10-CM

## 2025-08-05 DIAGNOSIS — E66.811 OBESITY, CLASS 1: ICD-10-CM

## 2025-08-05 PROCEDURE — 99214 OFFICE O/P EST MOD 30 MIN: CPT

## 2025-08-06 ENCOUNTER — LABORATORY RESULT (OUTPATIENT)
Age: 60
End: 2025-08-06

## 2025-08-06 PROBLEM — E78.5 DYSLIPIDEMIA: Status: ACTIVE | Noted: 2025-08-06

## 2025-08-06 PROBLEM — Z71.89 CARDIAC RISK COUNSELING: Status: ACTIVE | Noted: 2025-08-06

## 2025-08-07 ENCOUNTER — APPOINTMENT (OUTPATIENT)
Dept: BREAST CENTER | Facility: CLINIC | Age: 60
End: 2025-08-07

## 2025-08-07 VITALS
OXYGEN SATURATION: 96 % | TEMPERATURE: 97.9 F | DIASTOLIC BLOOD PRESSURE: 70 MMHG | SYSTOLIC BLOOD PRESSURE: 130 MMHG | HEART RATE: 69 BPM | WEIGHT: 190 LBS | BODY MASS INDEX: 30.53 KG/M2 | HEIGHT: 66 IN

## 2025-08-07 DIAGNOSIS — Z80.41 FAMILY HISTORY OF MALIGNANT NEOPLASM OF OVARY: ICD-10-CM

## 2025-08-08 ENCOUNTER — OUTPATIENT (OUTPATIENT)
Dept: OUTPATIENT SERVICES | Facility: HOSPITAL | Age: 60
LOS: 1 days | End: 2025-08-08

## 2025-08-08 ENCOUNTER — APPOINTMENT (OUTPATIENT)
Dept: BREAST CENTER | Facility: CLINIC | Age: 60
End: 2025-08-08
Payer: COMMERCIAL

## 2025-08-08 ENCOUNTER — APPOINTMENT (OUTPATIENT)
Dept: INTERNAL MEDICINE | Facility: CLINIC | Age: 60
End: 2025-08-08

## 2025-08-08 ENCOUNTER — TRANSCRIPTION ENCOUNTER (OUTPATIENT)
Age: 60
End: 2025-08-08

## 2025-08-08 VITALS
BODY MASS INDEX: 30.53 KG/M2 | OXYGEN SATURATION: 97 % | HEART RATE: 67 BPM | HEIGHT: 66 IN | TEMPERATURE: 98.2 F | DIASTOLIC BLOOD PRESSURE: 66 MMHG | WEIGHT: 190 LBS | SYSTOLIC BLOOD PRESSURE: 122 MMHG

## 2025-08-08 DIAGNOSIS — N60.19 DIFFUSE CYSTIC MASTOPATHY OF UNSPECIFIED BREAST: ICD-10-CM

## 2025-08-08 DIAGNOSIS — N60.02 SOLITARY CYST OF LEFT BREAST: ICD-10-CM

## 2025-08-08 DIAGNOSIS — Z86.79 PERSONAL HISTORY OF OTHER DISEASES OF THE CIRCULATORY SYSTEM: ICD-10-CM

## 2025-08-08 DIAGNOSIS — Z80.3 FAMILY HISTORY OF MALIGNANT NEOPLASM OF BREAST: ICD-10-CM

## 2025-08-08 DIAGNOSIS — Z78.9 OTHER SPECIFIED HEALTH STATUS: ICD-10-CM

## 2025-08-08 PROCEDURE — 99213 OFFICE O/P EST LOW 20 MIN: CPT

## 2025-08-08 RX ORDER — SEMAGLUTIDE 0.25 MG/.5ML
0.25 INJECTION, SOLUTION SUBCUTANEOUS
Qty: 1 | Refills: 0 | Status: ACTIVE | COMMUNITY
Start: 2025-08-05 | End: 1900-01-01

## 2025-08-12 ENCOUNTER — APPOINTMENT (OUTPATIENT)
Dept: OPHTHALMOLOGY | Facility: CLINIC | Age: 60
End: 2025-08-12
Payer: COMMERCIAL

## 2025-08-12 ENCOUNTER — NON-APPOINTMENT (OUTPATIENT)
Age: 60
End: 2025-08-12

## 2025-08-12 PROCEDURE — 99213 OFFICE O/P EST LOW 20 MIN: CPT

## 2025-08-19 ENCOUNTER — TRANSCRIPTION ENCOUNTER (OUTPATIENT)
Age: 60
End: 2025-08-19

## 2025-08-26 ENCOUNTER — NON-APPOINTMENT (OUTPATIENT)
Age: 60
End: 2025-08-26

## 2025-08-26 RX ORDER — DALTEPARIN SODIUM 7500 [IU]/.3ML
7500 INJECTION SUBCUTANEOUS
Qty: 10 | Refills: 0 | Status: ACTIVE | COMMUNITY
Start: 2025-08-26 | End: 1900-01-01

## 2025-08-26 RX ORDER — DALTEPARIN SODIUM 7500 [IU]/.3ML
7500 INJECTION SUBCUTANEOUS
Qty: 8 | Refills: 0 | Status: ACTIVE | COMMUNITY
Start: 2025-08-26 | End: 1900-01-01

## 2025-08-27 ENCOUNTER — NON-APPOINTMENT (OUTPATIENT)
Age: 60
End: 2025-08-27

## 2025-08-29 ENCOUNTER — LABORATORY RESULT (OUTPATIENT)
Age: 60
End: 2025-08-29

## 2025-09-10 ENCOUNTER — LABORATORY RESULT (OUTPATIENT)
Age: 60
End: 2025-09-10

## 2025-09-17 ENCOUNTER — APPOINTMENT (OUTPATIENT)
Dept: OPHTHALMOLOGY | Facility: CLINIC | Age: 60
End: 2025-09-17

## 2025-09-19 ENCOUNTER — LABORATORY RESULT (OUTPATIENT)
Age: 60
End: 2025-09-19